# Patient Record
Sex: FEMALE | Race: BLACK OR AFRICAN AMERICAN | NOT HISPANIC OR LATINO
[De-identification: names, ages, dates, MRNs, and addresses within clinical notes are randomized per-mention and may not be internally consistent; named-entity substitution may affect disease eponyms.]

---

## 2017-02-24 ENCOUNTER — APPOINTMENT (OUTPATIENT)
Dept: ORTHOPEDIC SURGERY | Facility: CLINIC | Age: 52
End: 2017-02-24

## 2017-02-24 VITALS
BODY MASS INDEX: 28.6 KG/M2 | HEIGHT: 70.5 IN | WEIGHT: 202 LBS | HEART RATE: 68 BPM | DIASTOLIC BLOOD PRESSURE: 83 MMHG | SYSTOLIC BLOOD PRESSURE: 143 MMHG

## 2017-02-24 DIAGNOSIS — M17.0 BILATERAL PRIMARY OSTEOARTHRITIS OF KNEE: ICD-10-CM

## 2017-06-02 ENCOUNTER — APPOINTMENT (OUTPATIENT)
Dept: ORTHOPEDIC SURGERY | Facility: CLINIC | Age: 52
End: 2017-06-02

## 2017-06-12 ENCOUNTER — APPOINTMENT (OUTPATIENT)
Dept: TRAUMA SURGERY | Facility: CLINIC | Age: 52
End: 2017-06-12

## 2019-11-24 ENCOUNTER — EMERGENCY (EMERGENCY)
Facility: HOSPITAL | Age: 54
LOS: 1 days | Discharge: HOME | End: 2019-11-24
Attending: EMERGENCY MEDICINE | Admitting: EMERGENCY MEDICINE
Payer: COMMERCIAL

## 2019-11-24 VITALS
SYSTOLIC BLOOD PRESSURE: 139 MMHG | HEART RATE: 99 BPM | OXYGEN SATURATION: 100 % | TEMPERATURE: 98 F | RESPIRATION RATE: 18 BRPM | DIASTOLIC BLOOD PRESSURE: 86 MMHG

## 2019-11-24 LAB
ALBUMIN SERPL ELPH-MCNC: 4.6 G/DL — SIGNIFICANT CHANGE UP (ref 3.3–5)
ALP SERPL-CCNC: 60 U/L — SIGNIFICANT CHANGE UP (ref 40–120)
ALT FLD-CCNC: 11 U/L — SIGNIFICANT CHANGE UP (ref 4–33)
ANION GAP SERPL CALC-SCNC: 11 MMO/L — SIGNIFICANT CHANGE UP (ref 7–14)
APTT BLD: 32.8 SEC — SIGNIFICANT CHANGE UP (ref 27.5–36.3)
AST SERPL-CCNC: 13 U/L — SIGNIFICANT CHANGE UP (ref 4–32)
BASOPHILS # BLD AUTO: 0.02 K/UL — SIGNIFICANT CHANGE UP (ref 0–0.2)
BASOPHILS NFR BLD AUTO: 0.3 % — SIGNIFICANT CHANGE UP (ref 0–2)
BILIRUB SERPL-MCNC: 0.2 MG/DL — SIGNIFICANT CHANGE UP (ref 0.2–1.2)
BUN SERPL-MCNC: 16 MG/DL — SIGNIFICANT CHANGE UP (ref 7–23)
CALCIUM SERPL-MCNC: 9.7 MG/DL — SIGNIFICANT CHANGE UP (ref 8.4–10.5)
CHLORIDE SERPL-SCNC: 102 MMOL/L — SIGNIFICANT CHANGE UP (ref 98–107)
CO2 SERPL-SCNC: 27 MMOL/L — SIGNIFICANT CHANGE UP (ref 22–31)
CREAT SERPL-MCNC: 0.92 MG/DL — SIGNIFICANT CHANGE UP (ref 0.5–1.3)
D DIMER BLD IA.RAPID-MCNC: 192 NG/ML — SIGNIFICANT CHANGE UP
EOSINOPHIL # BLD AUTO: 0.09 K/UL — SIGNIFICANT CHANGE UP (ref 0–0.5)
EOSINOPHIL NFR BLD AUTO: 1.3 % — SIGNIFICANT CHANGE UP (ref 0–6)
GLUCOSE SERPL-MCNC: 126 MG/DL — HIGH (ref 70–99)
HCT VFR BLD CALC: 37.8 % — SIGNIFICANT CHANGE UP (ref 34.5–45)
HGB BLD-MCNC: 11.5 G/DL — SIGNIFICANT CHANGE UP (ref 11.5–15.5)
IMM GRANULOCYTES NFR BLD AUTO: 0.8 % — SIGNIFICANT CHANGE UP (ref 0–1.5)
INR BLD: 1.08 — SIGNIFICANT CHANGE UP (ref 0.88–1.17)
LYMPHOCYTES # BLD AUTO: 2.83 K/UL — SIGNIFICANT CHANGE UP (ref 1–3.3)
LYMPHOCYTES # BLD AUTO: 39.6 % — SIGNIFICANT CHANGE UP (ref 13–44)
MCHC RBC-ENTMCNC: 25.1 PG — LOW (ref 27–34)
MCHC RBC-ENTMCNC: 30.4 % — LOW (ref 32–36)
MCV RBC AUTO: 82.4 FL — SIGNIFICANT CHANGE UP (ref 80–100)
MONOCYTES # BLD AUTO: 0.58 K/UL — SIGNIFICANT CHANGE UP (ref 0–0.9)
MONOCYTES NFR BLD AUTO: 8.1 % — SIGNIFICANT CHANGE UP (ref 2–14)
NEUTROPHILS # BLD AUTO: 3.57 K/UL — SIGNIFICANT CHANGE UP (ref 1.8–7.4)
NEUTROPHILS NFR BLD AUTO: 49.9 % — SIGNIFICANT CHANGE UP (ref 43–77)
NRBC # FLD: 0 K/UL — SIGNIFICANT CHANGE UP (ref 0–0)
PLATELET # BLD AUTO: 311 K/UL — SIGNIFICANT CHANGE UP (ref 150–400)
PMV BLD: 10.5 FL — SIGNIFICANT CHANGE UP (ref 7–13)
POTASSIUM SERPL-MCNC: 3.5 MMOL/L — SIGNIFICANT CHANGE UP (ref 3.5–5.3)
POTASSIUM SERPL-SCNC: 3.5 MMOL/L — SIGNIFICANT CHANGE UP (ref 3.5–5.3)
PROT SERPL-MCNC: 8.3 G/DL — SIGNIFICANT CHANGE UP (ref 6–8.3)
PROTHROM AB SERPL-ACNC: 12.3 SEC — SIGNIFICANT CHANGE UP (ref 9.8–13.1)
RBC # BLD: 4.59 M/UL — SIGNIFICANT CHANGE UP (ref 3.8–5.2)
RBC # FLD: 14.9 % — HIGH (ref 10.3–14.5)
SODIUM SERPL-SCNC: 140 MMOL/L — SIGNIFICANT CHANGE UP (ref 135–145)
TROPONIN T, HIGH SENSITIVITY: 7 NG/L — SIGNIFICANT CHANGE UP (ref ?–14)
TROPONIN T, HIGH SENSITIVITY: 7 NG/L — SIGNIFICANT CHANGE UP (ref ?–14)
WBC # BLD: 7.15 K/UL — SIGNIFICANT CHANGE UP (ref 3.8–10.5)
WBC # FLD AUTO: 7.15 K/UL — SIGNIFICANT CHANGE UP (ref 3.8–10.5)

## 2019-11-24 PROCEDURE — 99218: CPT

## 2019-11-24 PROCEDURE — 99244 OFF/OP CNSLTJ NEW/EST MOD 40: CPT

## 2019-11-24 PROCEDURE — 71045 X-RAY EXAM CHEST 1 VIEW: CPT | Mod: 26

## 2019-11-24 RX ORDER — KETOROLAC TROMETHAMINE 30 MG/ML
15 SYRINGE (ML) INJECTION ONCE
Refills: 0 | Status: DISCONTINUED | OUTPATIENT
Start: 2019-11-24 | End: 2019-11-24

## 2019-11-24 RX ORDER — IPRATROPIUM/ALBUTEROL SULFATE 18-103MCG
3 AEROSOL WITH ADAPTER (GRAM) INHALATION ONCE
Refills: 0 | Status: COMPLETED | OUTPATIENT
Start: 2019-11-24 | End: 2019-11-24

## 2019-11-24 RX ORDER — LOSARTAN POTASSIUM 100 MG/1
25 TABLET, FILM COATED ORAL DAILY
Refills: 0 | Status: DISCONTINUED | OUTPATIENT
Start: 2019-11-24 | End: 2019-11-28

## 2019-11-24 RX ORDER — LISINOPRIL 2.5 MG/1
20 TABLET ORAL DAILY
Refills: 0 | Status: DISCONTINUED | OUTPATIENT
Start: 2019-11-24 | End: 2019-11-24

## 2019-11-24 RX ORDER — IPRATROPIUM/ALBUTEROL SULFATE 18-103MCG
3 AEROSOL WITH ADAPTER (GRAM) INHALATION ONCE
Refills: 0 | Status: DISCONTINUED | OUTPATIENT
Start: 2019-11-24 | End: 2019-11-28

## 2019-11-24 RX ORDER — METFORMIN HYDROCHLORIDE 850 MG/1
750 TABLET ORAL DAILY
Refills: 0 | Status: DISCONTINUED | OUTPATIENT
Start: 2019-11-24 | End: 2019-11-28

## 2019-11-24 RX ORDER — HYDROCHLOROTHIAZIDE 25 MG
12.5 TABLET ORAL DAILY
Refills: 0 | Status: DISCONTINUED | OUTPATIENT
Start: 2019-11-24 | End: 2019-11-28

## 2019-11-24 RX ADMIN — Medication 15 MILLIGRAM(S): at 15:12

## 2019-11-24 RX ADMIN — Medication 3 MILLILITER(S): at 13:27

## 2019-11-24 RX ADMIN — Medication 15 MILLIGRAM(S): at 22:50

## 2019-11-24 RX ADMIN — Medication 125 MILLIGRAM(S): at 13:28

## 2019-11-24 RX ADMIN — Medication 3 MILLILITER(S): at 13:28

## 2019-11-24 RX ADMIN — Medication 15 MILLIGRAM(S): at 22:20

## 2019-11-24 RX ADMIN — Medication 3 MILLILITER(S): at 14:03

## 2019-11-24 NOTE — ED ADULT NURSE NOTE - OBJECTIVE STATEMENT
Patient received to [facility], A&Ox4, ambulatory at baseline, c/o SOB. Patient has PMH asthma, intubated 15 times, states this feels like previous asthma exacerbations. Also complaining of R sided chest pain radiating to back. Denies dizziness/HA, no N/V/D. No recent fever/cough/chills. No wheezes heard at this time. No edema noted, no diaphoresis. Pulses present in all extremities. Afebrile at this time. 22 G placed to R AC, labs drawn and sent. Patient received to Tr B, A&Ox4, ambulatory at baseline, c/o SOB. Patient has PMH asthma, intubated 15 times, states this feels like previous asthma exacerbations. Also complaining of R sided chest pain radiating to back. Denies dizziness/HA, no N/V/D. No recent fever/cough/chills. No wheezes heard at this time. No edema noted, no diaphoresis. Pulses present in all extremities. Afebrile at this time. 22 G placed to R AC, labs drawn and sent. Medicated as per orders. Patient reports partial relief of symptoms after duoneb treatments. MD at bedside. Vs as noted. Awaiting further orders, will continue to monitor.

## 2019-11-24 NOTE — ED PROVIDER NOTE - PMH
Asthma - intubated in 2006 and In ICU in 2008 for exacerbation of asthma , uses Advair and ventolin    Chronic Sinusitis    Gall stones    H/O sciatica    H/O sleep apnea - had sleep studies done 2 yrs ago    H/O umbilical hernia repair    H/O uterine leiomyoma    Hypertension    Umbilical hernia    Uterine cyst

## 2019-11-24 NOTE — ED CDU PROVIDER INITIAL DAY NOTE - PHYSICAL EXAMINATION
ATTENDING PHYSICAL EXAM  GEN - NAD; well appearing; A+O x3, speaking full sentences.  RR 14-16.  O2 100%RA.  HEAD - NC/AT; EYES/NOSE - PERRL, EOMI, mucous membranes moist, no discharge; THROAT: Oral cavity and pharynx normal. No inflammation, swelling, exudate, or lesions  NECK: Neck supple, non-tender without lymphadenopathy, no masses, no JVD  PULMONARY - CTA b/l, symmetric breath sounds  CARDIAC -s1s2, RRR, 2/6 THONY LUSB  ABDOMEN - +BS, ND, NT, soft, no guarding, no rebound, no masses   BACK - no CVA tenderness, No vertebral or paravertebral tenderness  EXTREMITIES - symmetric pulses, 2+ dp, capillary refill < 2 seconds, no clubbing, no cyanosis, no edema  SKIN - no rash or bruising   NEUROLOGIC - alert, CN 2-12 intact

## 2019-11-24 NOTE — ED PROVIDER NOTE - PSH
h/o C Section - 83, 86, 92    h/o D&C  multiple times    History of cholecystectomy - 1999    umbilical hernia repair 1993

## 2019-11-24 NOTE — ED CDU PROVIDER INITIAL DAY NOTE - OBJECTIVE STATEMENT
54F hx of asthma, HTN, HLD presents w a cc of SOB c/w prior asthma exacerbation also c/o substernal chest pain radiating to back, worsening over last few months was seen by PMD for same issue, schd'd to see cardiology but has not yet, last stress x2-3 years ago told normal. No worsening LE edema, has had prior dvt in LLE no AC. Denies n/v/f/c/. Denies headache, syncope, lightheadedness, dizziness. Denies chest palpitations, abdominal pain. Denies dysuria, hematuria, hematochezia, BRBPR, tarry stools, diarrhea, constipation.    CDU Note: Agree with above. 55 y/o female pmh htn asthma HLD presents to ER c/o chest pain and shortness of breath. Pt. in ED has trop1: 7, trop 2 pending. negaitve ddimer, negative cxr, - given duonebs and solumedrol which improved sob - sent to CDU for r/o acs - tele, trop x 2 and stress test. 54F hx of asthma, HTN, HLD presents w a cc of SOB c/w prior asthma exacerbation also c/o substernal chest pain radiating to back, worsening over last few months was seen by PMD for same issue, schmansi'd to see cardiology but has not yet, last stress x2-3 years ago told normal. No worsening LE edema, has had prior dvt in LLE no AC. Denies n/v/f/c/. Denies headache, syncope, lightheadedness, dizziness. Denies chest palpitations, abdominal pain. Denies dysuria, hematuria, hematochezia, BRBPR, tarry stools, diarrhea, constipation.    CDU Note: Agree with above. 53 y/o female pmh htn asthma HLD presents to ER c/o chest pain and shortness of breath. Pt. in ED has trop1: 7, trop 2 pending. negaitve ddimer, negative cxr, - given duonebs and solumedrol which improved sob - sent to CDU for r/o acs - tele, trop x 2 and stress test.  CDU Attsabrina Garcia - Agree with above.  I evaluated patient myself.  53 y/o F with hx of severe asthma requiring multiple ICU admissions and intubations in the past.  Has not had exacerbation x few years and not currently on asthma maintenance medications.  Developed shortness of breathe and cough on Wed which progressively worsened.  Received treatments in ED, and currently reports much improved with only minor shortness of breathe.  Also reports constant chest pain x 2 months which at times becomes severe.  States has been evaluated by PMD and GI MD for this, and is scheduled for EST.  Transferred to CDU for continued monitoring and assessment for ACS.

## 2019-11-24 NOTE — ED ADULT NURSE NOTE - CHIEF COMPLAINT QUOTE
Patient brought to ER from [facility] by EMS for asthma attack. Pt was intubated 15 times. Pt given 1 Combivent. Productive cough since Wednesday. 3 asthma attacks this morning and chest pain.

## 2019-11-24 NOTE — ED PROVIDER NOTE - CLINICAL SUMMARY MEDICAL DECISION MAKING FREE TEXT BOX
Noemi PGY3: 4F hx of asthma, HTN, HLD presents w a cc of SOB c/w prior asthma exacerbation also c/o substernal chest pain radiating to back, worsening over last few months was seen by PMD for same issue, schd'd to see cardiology but has not yet, last stress x2-3 years ago told normal.  exam vss non toxic ekg non ischemic c/f asthma exacerbation vs acs vs pe less likely dissection will check labs cxr dimer give duo nebs and steroids, reassess

## 2019-11-24 NOTE — ED PROVIDER NOTE - OBJECTIVE STATEMENT
54F hx of asthma, HTN, HLD presents w a cc of SOB c/w prior asthma exacerbation also c/o substernal chest pain radiating to back, worsening over last few months was seen by PMD for same issue, schmansi'd to see cardiology but has not yet, last stress x2-3 years ago told normal. No worsening LE edema, has had prior dvt in LLE no AC. Denies n/v/f/c/. Denies headache, syncope, lightheadedness, dizziness. Denies chest palpitations, abdominal pain. Denies dysuria, hematuria, hematochezia, BRBPR, tarry stools, diarrhea, constipation.

## 2019-11-24 NOTE — ED ADULT NURSE REASSESSMENT NOTE - NS ED NURSE REASSESS COMMENT FT1
break RN: pt A+OX4. breathing even and unlabored. reports some improvement in symptoms. report given to CDU CELINA Noble. to be brought to CDU for observation/stress test tomorrow.

## 2019-11-24 NOTE — CONSULT NOTE ADULT - SUBJECTIVE AND OBJECTIVE BOX
Patient seen and evaluated @ 1500 in [facility] ER  Chief Complaint: Chest discomfort    HPI: 54 year-old  woman with known history of asthma, hypertension, dyslipidemia, presents with a chief complaint of shortness of breath, episodes of chest discomfort with radiation to back, worsening over last few months was seen by PMD for same issue, scheduled to see cardiology but has not yet (last stress x2-3 years ago, told normal). The pain is reproducible by putting mild pressure over the area. No worsening LE edema, has had prior DVT in LLE no AC. Denies n/v/f/c/. Denies headache, syncope, lightheadedness, dizziness. Denies chest palpitations, abdominal pain. Denies dysuria, hematuria, hematochezia, BRBPR, tarry stools, diarrhea, constipation.    PMH:   Hypertension  H/O sleep apnea - had sleep studies done 2 yrs ago  Uterine cyst  Gall stones  Umbilical hernia  H/O umbilical hernia repair  H/O uterine leiomyoma  H/O sciatica  Chronic Sinusitis  Asthma - intubated in  and In ICU in  for exacerbation of asthma , uses Advair and ventolin    PSH:   History of cholecystectomy -   h/o D&C  multiple times  h/o C Section - 83, 86, 92  umbilical hernia repair     Home Medications: asthma inhaler  No cardiac medications    Allergies:  contrast dye (Anaphylaxis)  iodine (Anaphylaxis; Other)  latex (Hives)  Seafood; dye (Anaphylaxis)    FAMILY HISTORY:  Family history of prostate cancer: Father  from Prostate Cancer  Family history of hypertension: Mother and Father  Family history of diabetes mellitus: Mother and Father    Social History: marries, lives with   Smoking: nonsmoker  Alcohol: no EtOH abuse  Drugs: no illicit drug use    Review of Systems:    Constitutional: No fever, chills, fatigue, or changes in weight  HEENT: No blurry vision, eye pain, headache, runny nose, or sore throat  Respiratory: No shortness of breath, cough, or wheezing  Cardiovascular: Episodic chest pain that is transient  Gastrointestinal: No nausea, vomiting, diarrhea, or abdominal pain  Genitourinary: No dysuria or incontinence  Extremities: No lower extremity swelling  Neurologic: No focal findings  Lymphatic: No lymphadenopathy   Skin: No rash  Psychiatry: No anxiety or depression  10 point review of systems is otherwise negative except as mentioned above            Physical Exam:  T(C): 36.6 (19 @ 13:12), Max: 36.7 (11-24-19 @ 12:41)  HR: 90 (19 @ 15:58) (90 - 99)  BP: 139/61 (19 @ 15:58) (133/61 - 139/86)  RR: 18 (19 @ 15:58) (18 - 24)  SpO2: 100% (19 @ 15:58) (100% - 100%)  Wt(kg): --    Daily     Daily     Appearance: Normal, well groomed, NAD  Eyes: PERRLA, EOMI, pink conjunctiva, no scleral icterus   HENT: Normal oral mucosa  Cardiovascular: RRR, S1, S2, no murmur, rub, or gallop; no edema; no JVD  Procedural Access Site: Clean, dry, intact, without hematoma  Respiratory: Clear to auscultation bilaterally  Gastrointestinal: Soft, non-tender, non-distended, BS+  Musculoskeletal: No clubbing or joint deformity +chest wall tenderness   Neurologic: No focal weakness  Lymphatic: No lymphadenopathy  Psychiatry: AAOx3 with appropriate mood and affect  Skin: No rashes, ecchymoses, or cyanosis    Cardiovascular Diagnostic Testing:  ECG: NSR, voltage criteria for LVH, no ischemic changes     Echo: none on file    Stress Testing: none on file    Interpretation of Telemetry: NSR    Imaging: my interpretation of CXR: clear lungs with normal cardiac silhouette, no obvious rib fractures    Labs:                        11.5   7.15  )-----------( 311      ( 2019 13:34 )             37.8         140  |  102  |  16  ----------------------------<  126<H>  3.5   |  27  |  0.92    Ca    9.7      2019 13:34    TPro  8.3  /  Alb  4.6  /  TBili  0.2  /  DBili  x   /  AST  13  /  ALT  11  /  AlkPhos  60      PT/INR - ( 2019 13:34 )   PT: 12.3 SEC;   INR: 1.08          PTT - ( 2019 13:34 )  PTT:32.8 SEC

## 2019-11-24 NOTE — CONSULT NOTE ADULT - ASSESSMENT
54 year-old woman with likely costochondritis. Will rule out ACS by second set of negative high sensitivity troponin.  Planned for outpatient stress test. Can be done here tomorrow.  Monitor on tele in observation unit.    Can check echocardiogram and stress test while in observation unit, but unlikely cardiac etiology of symptoms that are reproducible, not consistently exertional.    Please call me with any questions overnight, cell: [phone].  Patient to be followed by her cardiologist, Carlos Griffith, DO starting tomorrow morning.

## 2019-11-24 NOTE — ED PROVIDER NOTE - PHYSICAL EXAMINATION
GEN APPEARANCE: WDWN, alert and cooperative, non-toxic appearing, uncomfortable appearing, resp discomfort   HEAD: Atraumatic, normocephalic   EYES: PERRLa, EOMI, vision grossly intact.   EARS: Gross hearing intact.   NOSE: No nasal discharge, no external evidence of epistaxis.   NECK: Supple  CV: RRR, S1S2, no c/r/m/g. No cyanosis or pallor. Extremities warm, well perfused. Cap refill <2 seconds. No bruits.   LUNGS: CTAB. No wheezing. No rales. No rhonchi. + L diminished breath sounds.   ABDOMEN: Soft, NTND. No guarding or rebound. No masses.   MSK: Spine appears normal, no spine point tenderness. No CVA ttp. No joint erythema or tenderness. Normal muscular development. Pelvis stable.  EXTREMITIES: No peripheral edema. No obvious joint or bony deformity.  NEURO: Alert, follows commands. Weight bearing normal. Speech normal. Sensation and motor normal x4 extremities.   SKIN: Normal color for race, warm, dry and intact. No evidence of rash.  PSYCH: Normal mood and affect.

## 2019-11-24 NOTE — ED ADULT TRIAGE NOTE - CHIEF COMPLAINT QUOTE
Patient brought to ER from [facility] by EMS for asthma attack. Pt was intubated 15 times. Pt given 1 Combivent. Productive cough since Wednesday. 3 asthma attacks this morning and chest pain.

## 2019-11-25 VITALS
TEMPERATURE: 98 F | SYSTOLIC BLOOD PRESSURE: 119 MMHG | HEART RATE: 68 BPM | OXYGEN SATURATION: 100 % | RESPIRATION RATE: 16 BRPM | DIASTOLIC BLOOD PRESSURE: 61 MMHG

## 2019-11-25 LAB — HBA1C BLD-MCNC: 6.6 % — HIGH (ref 4–5.6)

## 2019-11-25 PROCEDURE — 93018 CV STRESS TEST I&R ONLY: CPT | Mod: GC

## 2019-11-25 PROCEDURE — 78452 HT MUSCLE IMAGE SPECT MULT: CPT | Mod: 26

## 2019-11-25 PROCEDURE — 99217: CPT

## 2019-11-25 PROCEDURE — 93306 TTE W/DOPPLER COMPLETE: CPT | Mod: 26

## 2019-11-25 PROCEDURE — 93016 CV STRESS TEST SUPVJ ONLY: CPT | Mod: GC

## 2019-11-25 RX ORDER — ALBUTEROL 90 UG/1
2 AEROSOL, METERED ORAL
Qty: 1 | Refills: 0
Start: 2019-11-25

## 2019-11-25 RX ADMIN — Medication 12.5 MILLIGRAM(S): at 06:00

## 2019-11-25 RX ADMIN — METFORMIN HYDROCHLORIDE 750 MILLIGRAM(S): 850 TABLET ORAL at 13:25

## 2019-11-25 RX ADMIN — LOSARTAN POTASSIUM 25 MILLIGRAM(S): 100 TABLET, FILM COATED ORAL at 06:00

## 2019-11-25 NOTE — ED CDU PROVIDER DISPOSITION NOTE - ATTENDING CONTRIBUTION TO CARE
55 y/o female pmh htn asthma HLD presents to ER c/o chest pain for few months and shortness of breath x several days with cough, similar to prior asthma. Pt. in ED has trop1: 7, trop 2: 7  with negative delta negaitve ddimer, negative cxr, - given duonebs and solumedrol with significant improvement in sob, seen by cardiology, rec stress though low likelihood for acs. On rounds this am, Patient states improved sob, no cp, no edema. Exam benign. Was monitored on tele and had stress test which was negative. Patient was discharged home with steroid course for asthma, all results d/w patient and copies given, instructed to f/u pcp/cardiology within next few days. Return precautions discussed.

## 2019-11-25 NOTE — ED CDU PROVIDER DISPOSITION NOTE - NSFOLLOWUPINSTRUCTIONS_ED_ALL_ED_FT
Advance activity as tolerated.  Continue all previously prescribed medications as directed unless otherwise instructed.  Take Prednisone once daily for the next 5 days.  Use Albuterol Inhaler 2 puffs every 4 to 6 hours as needed for shortness of breath and/or wheezing.  Follow up with your primary care physician and cardiologist in 48-72 hours- bring copies of your results.  Return to the ER for worsening or persistent symptoms, including but not limited to worsening/persistent chest pain, shortness of breath, wheezing, lightheadedness, passing out and/or ANY NEW OR CONCERNING SYMPTOMS. If you have issues obtaining follow up, please call: [phone] to obtain a doctor or specialist who takes your insurance in your area.  You may call [phone] to make an appointment with the internal medicine clinic.

## 2019-11-25 NOTE — ED CDU PROVIDER SUBSEQUENT DAY NOTE - PROGRESS NOTE DETAILS
Pt notes feeling significantly better.  Stress and echo unremarkable.  Results discussed with pt's cardiologist Dr. Griffith.  Pt is medically stable for discharge. Pt to follow up with PMD and her cardiologist.  Strict return precautions given.  Pt ambulatory around unit without any difficulty.  Reassessment performed and plan for discharge discussed with Dr. Day who agrees with disposition and discharge plan.

## 2019-11-25 NOTE — ED CDU PROVIDER DISPOSITION NOTE - CLINICAL COURSE
53 y/o female pmh htn asthma HLD presents to ER c/o chest pain for few months and shortness of breath x several days with cough, similar to prior asthma. Pt. in ED has trop1: 7, trop 2: 7  with negative delta negaitve ddimer, negative cxr, - given duonebs and solumedrol with significant improvement in sob, seen by cardiology, rec stress though low likelihood for acs. On rounds this am, Patient states improved sob, no cp, no edema. Exam benign. Was monitored on tele and had stress test which was negative. Patient was discharged home with steroid course for asthma, all results d/w patient and copies given, instructed to f/u pcp/cardiology within next few days. Return precautions discussed.

## 2019-11-25 NOTE — ED CDU PROVIDER SUBSEQUENT DAY NOTE - PHYSICAL EXAMINATION
GEN - NAD; well appearing; A+O x3, speaking full sentences.  RR 14-16.  O2 100%RA.  HEAD - NC/AT; EYES/NOSE - PERRL, EOMI, mucous membranes moist, no discharge; THROAT: Oral cavity and pharynx normal. No inflammation, swelling, exudate, or lesions  NECK: Neck supple, non-tender without lymphadenopathy, no masses, no JVD  PULMONARY - CTA b/l, symmetric breath sounds  CARDIAC -s1s2, RRR, 2/6 THONY LUSB  ABDOMEN - +BS, ND, NT, soft, no guarding, no rebound, no masses   BACK - no CVA tenderness, No vertebral or paravertebral tenderness  EXTREMITIES - symmetric pulses, 2+ dp, capillary refill < 2 seconds, no clubbing, no cyanosis, no edema  SKIN - no rash or bruising   NEUROLOGIC - alert, CN 2-12 intact

## 2019-11-25 NOTE — ED CDU PROVIDER SUBSEQUENT DAY NOTE - HISTORY
CDU Initial Dayt Note: 55 y/o female pmh htn asthma HLD presents to ER c/o chest pain and shortness of breath. Pt. in ED has trop1: 7, trop 2 pending. negaitve ddimer, negative cxr, - given duonebs and solumedrol which improved sob - sent to CDU for r/o acs - tele, trop x 2 and stress test.  CDU Subsequent Day Note: DANNA Alas, Agree w/ above, pt resting in bed comfortably in NAD, has had c/o intermittent chest pain, feeling better after Toradol. Pending stress/echo, no events on monitor since sign out last night.

## 2019-11-25 NOTE — ED CDU PROVIDER SUBSEQUENT DAY NOTE - ATTENDING CONTRIBUTION TO CARE
53 y/o female pmh htn asthma HLD presents to ER c/o chest pain for few months and shortness of breath x several days with cough, similar to prior asthma. Pt. in ED has trop1: 7, trop 2: 7  with negative delta negaitve ddimer, negative cxr, - given duonebs and solumedrol with significant improvement in sob, seen by cardiology, rec stress though low likelihood for acs. On rounds this am, Patient states improved sob, no cp, no edema.  on exam  A & O x 3, NAD, HEENT WNL and no facial asymmetry; lungs CTAB, heart with reg rhythm without murmur; abdomen soft NTND; extremities with no edema; neuro exam non focal with no motor or sensory deficits.  Plan for today, continue tele monitoring/stress test.

## 2019-11-25 NOTE — ED CDU PROVIDER DISPOSITION NOTE - PATIENT PORTAL LINK FT
You can access the FollowMyHealth Patient Portal offered by [facility] by registering at the following website: [email or web address]. By joining [employer]’s FollowMyHealth portal, you will also be able to view your health information using other applications (apps) compatible with our system.

## 2019-12-02 ENCOUNTER — APPOINTMENT (OUTPATIENT)
Dept: CT IMAGING | Facility: IMAGING CENTER | Age: 54
End: 2019-12-02
Payer: COMMERCIAL

## 2019-12-02 ENCOUNTER — OUTPATIENT (OUTPATIENT)
Dept: OUTPATIENT SERVICES | Facility: HOSPITAL | Age: 54
LOS: 1 days | End: 2019-12-02
Payer: COMMERCIAL

## 2019-12-02 DIAGNOSIS — Z00.8 ENCOUNTER FOR OTHER GENERAL EXAMINATION: ICD-10-CM

## 2019-12-02 PROCEDURE — 74176 CT ABD & PELVIS W/O CONTRAST: CPT

## 2019-12-02 PROCEDURE — 74176 CT ABD & PELVIS W/O CONTRAST: CPT | Mod: 26

## 2019-12-29 ENCOUNTER — RESULT REVIEW (OUTPATIENT)
Age: 54
End: 2019-12-29

## 2020-03-02 ENCOUNTER — EMERGENCY (EMERGENCY)
Facility: HOSPITAL | Age: 55
LOS: 1 days | Discharge: HOME | End: 2020-03-02
Attending: EMERGENCY MEDICINE
Payer: COMMERCIAL

## 2020-03-02 VITALS
HEART RATE: 62 BPM | HEIGHT: 70 IN | WEIGHT: 186.95 LBS | OXYGEN SATURATION: 98 % | SYSTOLIC BLOOD PRESSURE: 104 MMHG | RESPIRATION RATE: 16 BRPM | TEMPERATURE: 98 F | DIASTOLIC BLOOD PRESSURE: 64 MMHG

## 2020-03-02 LAB
ALBUMIN SERPL ELPH-MCNC: 4.2 G/DL — SIGNIFICANT CHANGE UP (ref 3.3–5)
ALP SERPL-CCNC: 49 U/L — SIGNIFICANT CHANGE UP (ref 40–120)
ALT FLD-CCNC: 10 U/L — SIGNIFICANT CHANGE UP (ref 10–45)
ANION GAP SERPL CALC-SCNC: 11 MMOL/L — SIGNIFICANT CHANGE UP (ref 5–17)
APPEARANCE UR: CLEAR — SIGNIFICANT CHANGE UP
AST SERPL-CCNC: 16 U/L — SIGNIFICANT CHANGE UP (ref 10–40)
BACTERIA # UR AUTO: NEGATIVE — SIGNIFICANT CHANGE UP
BASOPHILS # BLD AUTO: 0.04 K/UL — SIGNIFICANT CHANGE UP (ref 0–0.2)
BASOPHILS NFR BLD AUTO: 0.5 % — SIGNIFICANT CHANGE UP (ref 0–2)
BILIRUB SERPL-MCNC: 0.2 MG/DL — SIGNIFICANT CHANGE UP (ref 0.2–1.2)
BILIRUB UR-MCNC: NEGATIVE — SIGNIFICANT CHANGE UP
BUN SERPL-MCNC: 16 MG/DL — SIGNIFICANT CHANGE UP (ref 7–23)
CALCIUM SERPL-MCNC: 9.3 MG/DL — SIGNIFICANT CHANGE UP (ref 8.4–10.5)
CHLORIDE SERPL-SCNC: 105 MMOL/L — SIGNIFICANT CHANGE UP (ref 96–108)
CO2 SERPL-SCNC: 24 MMOL/L — SIGNIFICANT CHANGE UP (ref 22–31)
COLOR SPEC: YELLOW — SIGNIFICANT CHANGE UP
CREAT SERPL-MCNC: 0.7 MG/DL — SIGNIFICANT CHANGE UP (ref 0.5–1.3)
DIFF PNL FLD: NEGATIVE — SIGNIFICANT CHANGE UP
EOSINOPHIL # BLD AUTO: 0.24 K/UL — SIGNIFICANT CHANGE UP (ref 0–0.5)
EOSINOPHIL NFR BLD AUTO: 3.3 % — SIGNIFICANT CHANGE UP (ref 0–6)
EPI CELLS # UR: 1 /HPF — SIGNIFICANT CHANGE UP
GLUCOSE SERPL-MCNC: 103 MG/DL — HIGH (ref 70–99)
GLUCOSE UR QL: NEGATIVE — SIGNIFICANT CHANGE UP
HCT VFR BLD CALC: 37.3 % — SIGNIFICANT CHANGE UP (ref 34.5–45)
HGB BLD-MCNC: 11.1 G/DL — LOW (ref 11.5–15.5)
HYALINE CASTS # UR AUTO: 5 /LPF — HIGH (ref 0–2)
IMM GRANULOCYTES NFR BLD AUTO: 0.3 % — SIGNIFICANT CHANGE UP (ref 0–1.5)
KETONES UR-MCNC: NEGATIVE — SIGNIFICANT CHANGE UP
LEUKOCYTE ESTERASE UR-ACNC: ABNORMAL
LYMPHOCYTES # BLD AUTO: 3.71 K/UL — HIGH (ref 1–3.3)
LYMPHOCYTES # BLD AUTO: 50.8 % — HIGH (ref 13–44)
MCHC RBC-ENTMCNC: 25.6 PG — LOW (ref 27–34)
MCHC RBC-ENTMCNC: 29.8 GM/DL — LOW (ref 32–36)
MCV RBC AUTO: 85.9 FL — SIGNIFICANT CHANGE UP (ref 80–100)
MONOCYTES # BLD AUTO: 0.7 K/UL — SIGNIFICANT CHANGE UP (ref 0–0.9)
MONOCYTES NFR BLD AUTO: 9.6 % — SIGNIFICANT CHANGE UP (ref 2–14)
NEUTROPHILS # BLD AUTO: 2.6 K/UL — SIGNIFICANT CHANGE UP (ref 1.8–7.4)
NEUTROPHILS NFR BLD AUTO: 35.5 % — LOW (ref 43–77)
NITRITE UR-MCNC: NEGATIVE — SIGNIFICANT CHANGE UP
NRBC # BLD: 0 /100 WBCS — SIGNIFICANT CHANGE UP (ref 0–0)
PH UR: 6 — SIGNIFICANT CHANGE UP (ref 5–8)
PLATELET # BLD AUTO: 277 K/UL — SIGNIFICANT CHANGE UP (ref 150–400)
POTASSIUM SERPL-MCNC: 4 MMOL/L — SIGNIFICANT CHANGE UP (ref 3.5–5.3)
POTASSIUM SERPL-SCNC: 4 MMOL/L — SIGNIFICANT CHANGE UP (ref 3.5–5.3)
PROT SERPL-MCNC: 7.2 G/DL — SIGNIFICANT CHANGE UP (ref 6–8.3)
PROT UR-MCNC: ABNORMAL
RBC # BLD: 4.34 M/UL — SIGNIFICANT CHANGE UP (ref 3.8–5.2)
RBC # FLD: 13.9 % — SIGNIFICANT CHANGE UP (ref 10.3–14.5)
RBC CASTS # UR COMP ASSIST: 5 /HPF — HIGH (ref 0–4)
SODIUM SERPL-SCNC: 140 MMOL/L — SIGNIFICANT CHANGE UP (ref 135–145)
SP GR SPEC: 1.03 — HIGH (ref 1.01–1.02)
UROBILINOGEN FLD QL: NEGATIVE — SIGNIFICANT CHANGE UP
WBC # BLD: 7.31 K/UL — SIGNIFICANT CHANGE UP (ref 3.8–10.5)
WBC # FLD AUTO: 7.31 K/UL — SIGNIFICANT CHANGE UP (ref 3.8–10.5)
WBC UR QL: 50 /HPF — HIGH (ref 0–5)

## 2020-03-02 PROCEDURE — 71045 X-RAY EXAM CHEST 1 VIEW: CPT | Mod: 26

## 2020-03-02 PROCEDURE — 99284 EMERGENCY DEPT VISIT MOD MDM: CPT

## 2020-03-02 PROCEDURE — 72100 X-RAY EXAM L-S SPINE 2/3 VWS: CPT | Mod: 26

## 2020-03-02 RX ORDER — IBUPROFEN 200 MG
600 TABLET ORAL ONCE
Refills: 0 | Status: COMPLETED | OUTPATIENT
Start: 2020-03-02 | End: 2020-03-02

## 2020-03-02 RX ORDER — LIDOCAINE 4 G/100G
1 CREAM TOPICAL ONCE
Refills: 0 | Status: COMPLETED | OUTPATIENT
Start: 2020-03-02 | End: 2020-03-02

## 2020-03-02 RX ORDER — MORPHINE SULFATE 50 MG/1
4 CAPSULE, EXTENDED RELEASE ORAL ONCE
Refills: 0 | Status: DISCONTINUED | OUTPATIENT
Start: 2020-03-02 | End: 2020-03-02

## 2020-03-02 RX ORDER — ACETAMINOPHEN 500 MG
975 TABLET ORAL ONCE
Refills: 0 | Status: COMPLETED | OUTPATIENT
Start: 2020-03-02 | End: 2020-03-02

## 2020-03-02 RX ADMIN — Medication 975 MILLIGRAM(S): at 21:12

## 2020-03-02 RX ADMIN — LIDOCAINE 1 PATCH: 4 CREAM TOPICAL at 21:29

## 2020-03-02 RX ADMIN — Medication 600 MILLIGRAM(S): at 21:28

## 2020-03-02 RX ADMIN — MORPHINE SULFATE 4 MILLIGRAM(S): 50 CAPSULE, EXTENDED RELEASE ORAL at 23:16

## 2020-03-02 NOTE — ED PROVIDER NOTE - OBJECTIVE STATEMENT
54y f PMHx sciatica, HTN, asthma, DM2 p/w back pain. Pt reports severe lower back pain since yesterday radiating down her left leg. Reports the pain began yesterday with nonspecific onset but admits to doing laundry shortly before. Reports difficulty walking and feeling of weakness in her left foot. 54y f PMHx sciatica, HTN, asthma, DM2 p/w back pain. Pt reports severe lower back pain since yesterday radiating down her left leg. Reports the pain began yesterday with nonspecific onset. She denies traumatic injury, heavy lifting or twisting however admits to doing laundry shortly before. Reports difficulty walking and feeling of weakness in her left foot. She reports she had RLE weakness/numbness which has resolved. Denies urinary retention/incontinence, fever, chills, saddle anesthesia, IV drug use, CP, SOB.

## 2020-03-02 NOTE — ED PROVIDER NOTE - MUSCULOSKELETAL MINIMAL EXAM
atraumatic/neck supple/MUSCLE SPASMS/normal range of motion/TENDERNESS/+LLE straight leg test to approx 45deg

## 2020-03-02 NOTE — ED PROVIDER NOTE - PROGRESS NOTE DETAILS
Pt ambulatory in ED hallways without difficulty. Admits to moderate improvement from pain meds. Labs pending. will most likely DC with followup - Greber Crowley PA-C

## 2020-03-02 NOTE — ED PROVIDER NOTE - NEUROLOGICAL, MLM
Alert and oriented, no focal deficits, no motor or sensory deficits. RLE/LLE sensory grossly intact. 5/5 strength throughout

## 2020-03-02 NOTE — ED PROVIDER NOTE - RAPID ASSESSMENT
54y F with PMHx of Sciatica, HTN, Asthma, DMT2 (non insulin dependent) presents to ED c/o severe back pain radiating down B/L legs, worse in left leg, with weakness in B/L LE since yesterday. Currently unable to ambulate due to sx. Reports similar episodes of sx when pt was 24 y/o. Denies urinary or bowel incontinence, F/C, fall or trauma to affected area, or recent travel. Further denies use of blood thinners. Allergic to IV contrast dye.     **Pt seen in waiting room by Rui OCONNOR), documentation completed by Keyla Parra. Pt to be sent to main ED for further evaluation - all orders placed to be followed by MD in the main ED**

## 2020-03-02 NOTE — ED PROVIDER NOTE - NS_ ATTENDINGSCRIBEDETAILS _ED_A_ED_FT
The scribe's documentation has been prepared under my direction and personally reviewed by me in its entirety. I confirm that the note above accurately reflects all work, treatment, procedures, and medical decision making performed by me.  RAGHAVENDRA Limon MD   I have performed a rapid screening assessment of this patient in the waiting room.  Additional evaluation and/or testing to be performed in the main Emergency Department by another attending. RAGHAVENDRA Limon MD

## 2020-03-02 NOTE — ED PROVIDER NOTE - CLINICAL SUMMARY MEDICAL DECISION MAKING FREE TEXT BOX
Back pain,nontraumatic this am with rad to left leg, Diff ambulating. concerns for sciatica no red flag ss. Treat pain. reassess  Phong Patel MD, Facep

## 2020-03-02 NOTE — ED PROVIDER NOTE - PATIENT PORTAL LINK FT
You can access the FollowMyHealth Patient Portal offered by [facility] by registering at the following website: [email or web address]. By joining [employer]’s FollowMyHealth portal, you will also be able to view your health information using other applications (apps) compatible with our system.

## 2020-03-02 NOTE — ED PROVIDER NOTE - NSFOLLOWUPINSTRUCTIONS_ED_ALL_ED_FT
Please follow up with your primary care doctor within 1 week.     Take Motrin 400-600mg every 6 hrs as needed for pain. Take with food   Take Tylenol 650mg every 6 hrs as needed for pain.   Oxycodone 5mg every 6 hours as needed for pain. Do not drive or consume alcohol while taking.     Return to ED for worsening pain, inability to walk, numbness, weakness, numbness to your buttock area, or any other concerns.

## 2020-03-02 NOTE — ED PROVIDER NOTE - ATTENDING CONTRIBUTION TO CARE
Private Physician Anahi ([facility])  54y female pmh  Asthma, Gallstones, HTN, Sciatica pt comes to ed complains of pain left back rad to leg with diff ambulating. No incontinece, loc, trauma, fever chills. shortness of breath.cp. PE WDWN female awake alert ncadt neck supple chest clear anterior & posterior abd soft cv no rmg, neuro no focal defects. +slr and cross striaight leg raise distal pulse sensation intact  Phong Patel MD, Facep

## 2020-03-03 VITALS
RESPIRATION RATE: 18 BRPM | OXYGEN SATURATION: 99 % | HEART RATE: 60 BPM | DIASTOLIC BLOOD PRESSURE: 65 MMHG | SYSTOLIC BLOOD PRESSURE: 120 MMHG | TEMPERATURE: 98 F

## 2020-03-03 PROCEDURE — 85027 COMPLETE CBC AUTOMATED: CPT

## 2020-03-03 PROCEDURE — 87086 URINE CULTURE/COLONY COUNT: CPT

## 2020-03-03 PROCEDURE — 87186 SC STD MICRODIL/AGAR DIL: CPT

## 2020-03-03 PROCEDURE — 96374 THER/PROPH/DIAG INJ IV PUSH: CPT

## 2020-03-03 PROCEDURE — 81001 URINALYSIS AUTO W/SCOPE: CPT

## 2020-03-03 PROCEDURE — 71045 X-RAY EXAM CHEST 1 VIEW: CPT

## 2020-03-03 PROCEDURE — 72100 X-RAY EXAM L-S SPINE 2/3 VWS: CPT

## 2020-03-03 PROCEDURE — 99284 EMERGENCY DEPT VISIT MOD MDM: CPT | Mod: 25

## 2020-03-03 PROCEDURE — 80053 COMPREHEN METABOLIC PANEL: CPT

## 2020-03-03 RX ORDER — OXYCODONE HYDROCHLORIDE 5 MG/1
1 TABLET ORAL
Qty: 20 | Refills: 0
Start: 2020-03-03 | End: 2020-03-07

## 2020-03-03 RX ORDER — CEPHALEXIN 500 MG
1 CAPSULE ORAL
Qty: 14 | Refills: 0
Start: 2020-03-03 | End: 2020-03-09

## 2020-03-03 NOTE — ED ADULT NURSE NOTE - OBJECTIVE STATEMENT
54y f PMHx sciatica, HTN, asthma, DM2 p/w back pain. Pt reports severe lower back pain since yesterday radiating down her left leg. Reports the pain began yesterday with nonspecific onset. She denies traumatic injury, heavy lifting or twisting however admits to doing laundry shortly before. Reports difficulty walking and feeling of weakness in her left foot. She reports she had RLE weakness/numbness which has resolved. Denies urinary retention/incontinence, fever, chills, saddle anesthesia, IV drug use, CP, SOB.

## 2020-03-05 NOTE — ED POST DISCHARGE NOTE - DETAILS
3/5/20: LVM for daughter, pts primary # not in service. Pt was seen in ED for back pain, given keflex for suspected UTI. Will call back to determine if symptomatic - may DC abx if not. - Gerber Crowley PA-C 3/5/20: Pt contacted she is not having any urinary symptoms. advised her to DC antibiotics at this time. Gave follow up instructions and return precautions. No need for further callback. - Gerber Crowley PA-C

## 2021-02-03 ENCOUNTER — RESULT REVIEW (OUTPATIENT)
Age: 56
End: 2021-02-03

## 2021-03-22 NOTE — ED ADULT TRIAGE NOTE - BSA (M2)
LOV 3/11/2021    LAST LAB    LAST RX  Pantoprazole Sodium 40 MG Oral Tab EC 30 tablet 0 2/11/2021         Next OV   Future Appointments   Date Time Provider Kiel Alarcon   3/22/2021  3:00 PM Gabby Queen MD [address] [place] [address]
2.03

## 2022-02-23 ENCOUNTER — RESULT REVIEW (OUTPATIENT)
Age: 57
End: 2022-02-23

## 2022-09-17 ENCOUNTER — NON-APPOINTMENT (OUTPATIENT)
Age: 57
End: 2022-09-17

## 2022-09-18 ENCOUNTER — RESULT REVIEW (OUTPATIENT)
Age: 57
End: 2022-09-18

## 2023-07-20 ENCOUNTER — EMERGENCY (EMERGENCY)
Facility: HOSPITAL | Age: 58
LOS: 1 days | Discharge: HOME | End: 2023-07-20
Attending: EMERGENCY MEDICINE | Admitting: EMERGENCY MEDICINE
Payer: MEDICAID

## 2023-07-20 VITALS
DIASTOLIC BLOOD PRESSURE: 74 MMHG | SYSTOLIC BLOOD PRESSURE: 143 MMHG | RESPIRATION RATE: 18 BRPM | TEMPERATURE: 98 F | OXYGEN SATURATION: 100 % | HEART RATE: 68 BPM

## 2023-07-20 PROCEDURE — 99285 EMERGENCY DEPT VISIT HI MDM: CPT

## 2023-07-20 PROCEDURE — 93010 ELECTROCARDIOGRAM REPORT: CPT

## 2023-07-20 NOTE — ED ADULT NURSE NOTE - NSICDXPASTMEDICALHX_GEN_ALL_CORE_FT
PAST MEDICAL HISTORY:  Asthma - intubated in 2006 and In ICU in 2008 for exacerbation of asthma , uses Advair and ventolin     Chronic Sinusitis     Gall stones     H/O sciatica     H/O sleep apnea - had sleep studies done 2 yrs ago     H/O umbilical hernia repair     H/O uterine leiomyoma     Hypertension     Umbilical hernia     Uterine cyst

## 2023-07-20 NOTE — ED ADULT NURSE NOTE - NSICDXFAMILYHX_GEN_ALL_CORE_FT
FAMILY HISTORY:  Family history of diabetes mellitus, Mother and Father  Family history of hypertension, Mother and Father  Family history of prostate cancer, Father  from Prostate Cancer

## 2023-07-20 NOTE — ED ADULT TRIAGE NOTE - BP NONINVASIVE DIASTOLIC (MM HG)
Patient off unit with transport. VSS. Patient alert and oriented. Patient on 2L NC to maintain spo2 >90% while sleeping.     All belongings in backpack and to floor with patient.    74

## 2023-07-20 NOTE — ED ADULT NURSE NOTE - NSFALLUNIVINTERV_ED_ALL_ED
Bed/Stretcher in lowest position, wheels locked, appropriate side rails in place/Call bell, personal items and telephone in reach/Instruct patient to call for assistance before getting out of bed/chair/stretcher/Non-slip footwear applied when patient is off stretcher/[place] to call system/Physically safe environment - no spills, clutter or unnecessary equipment/Purposeful proactive rounding/Room/bathroom lighting operational, light cord in reach

## 2023-07-20 NOTE — ED ADULT NURSE NOTE - NSICDXPASTSURGICALHX_GEN_ALL_CORE_FT
PAST SURGICAL HISTORY:  h/o C Section - 83, 86, 92     h/o D&C  multiple times     History of cholecystectomy - 1999     umbilical hernia repair 1993

## 2023-07-20 NOTE — ED ADULT NURSE NOTE - OBJECTIVE STATEMENT
Facilitator RN: Patient received in room 24. Patient A&Ox4 and ambulatory at baseline. Patient presents to the ED c/o left leg pain with radiation to lower back. phx arthritis, asthma, HTN, DM. Patient states yesterday she received a "gel injection" from her outpatient provider for arthritis. Patient states today she started to experience shortness of breath, dyspnea, chest pain, "shooting right arm pain", left leg pain with radiation to lower back and was referred to come into the ED by outpatient provider for a possible reaction to injection. Patient states she is currently able to bear weight on her legs and ambulate; patient rates pain 10/10 at this time. Patient able to speak in complete uninterrupted sentences; patient sating above 95% on room air. Vital signs stable, airway patent, chest rise equal bilaterally, lung sounds clear and equal bilaterally, respirations unlabored.  Patient denies headache, lightheadedness, and dizziness. Abdomen flat, soft and non-distended; patient denies nausea/vomiting and changes in BMs/urine. Pulse/motor/sensory present and no edema noted to all four extremities. Safety measures in place, call bell within reach and family at bedside. Report given to primary nurse, CELINA Tillman.

## 2023-07-20 NOTE — ED ADULT TRIAGE NOTE - CHIEF COMPLAINT QUOTE
pt c/o left leg pain, lower back pain, chest pain and shortness of breath that started after receiving a "gel shot" in left knee. history of HTN, DM, HLD

## 2023-07-21 VITALS
HEART RATE: 65 BPM | SYSTOLIC BLOOD PRESSURE: 132 MMHG | OXYGEN SATURATION: 100 % | RESPIRATION RATE: 17 BRPM | TEMPERATURE: 98 F | DIASTOLIC BLOOD PRESSURE: 72 MMHG

## 2023-07-21 LAB
ALBUMIN SERPL ELPH-MCNC: 4.8 G/DL — SIGNIFICANT CHANGE UP (ref 3.3–5)
ALP SERPL-CCNC: 57 U/L — SIGNIFICANT CHANGE UP (ref 40–120)
ALT FLD-CCNC: 14 U/L — SIGNIFICANT CHANGE UP (ref 4–33)
ANION GAP SERPL CALC-SCNC: 14 MMOL/L — SIGNIFICANT CHANGE UP (ref 7–14)
AST SERPL-CCNC: 22 U/L — SIGNIFICANT CHANGE UP (ref 4–32)
BASOPHILS # BLD AUTO: 0.03 K/UL — SIGNIFICANT CHANGE UP (ref 0–0.2)
BASOPHILS NFR BLD AUTO: 0.4 % — SIGNIFICANT CHANGE UP (ref 0–2)
BILIRUB SERPL-MCNC: 0.4 MG/DL — SIGNIFICANT CHANGE UP (ref 0.2–1.2)
BUN SERPL-MCNC: 14 MG/DL — SIGNIFICANT CHANGE UP (ref 7–23)
CALCIUM SERPL-MCNC: 9.7 MG/DL — SIGNIFICANT CHANGE UP (ref 8.4–10.5)
CHLORIDE SERPL-SCNC: 103 MMOL/L — SIGNIFICANT CHANGE UP (ref 98–107)
CO2 SERPL-SCNC: 24 MMOL/L — SIGNIFICANT CHANGE UP (ref 22–31)
CREAT SERPL-MCNC: 0.78 MG/DL — SIGNIFICANT CHANGE UP (ref 0.5–1.3)
D DIMER BLD IA.RAPID-MCNC: <150 NG/ML DDU — SIGNIFICANT CHANGE UP
EGFR: 87 ML/MIN/1.73M2 — SIGNIFICANT CHANGE UP
EOSINOPHIL # BLD AUTO: 0.14 K/UL — SIGNIFICANT CHANGE UP (ref 0–0.5)
EOSINOPHIL NFR BLD AUTO: 1.9 % — SIGNIFICANT CHANGE UP (ref 0–6)
GLUCOSE SERPL-MCNC: 116 MG/DL — HIGH (ref 70–99)
HCT VFR BLD CALC: 42.8 % — SIGNIFICANT CHANGE UP (ref 34.5–45)
HGB BLD-MCNC: 13.1 G/DL — SIGNIFICANT CHANGE UP (ref 11.5–15.5)
IANC: 3.31 K/UL — SIGNIFICANT CHANGE UP (ref 1.8–7.4)
IMM GRANULOCYTES NFR BLD AUTO: 0.4 % — SIGNIFICANT CHANGE UP (ref 0–0.9)
LYMPHOCYTES # BLD AUTO: 3.15 K/UL — SIGNIFICANT CHANGE UP (ref 1–3.3)
LYMPHOCYTES # BLD AUTO: 42 % — SIGNIFICANT CHANGE UP (ref 13–44)
MCHC RBC-ENTMCNC: 27 PG — SIGNIFICANT CHANGE UP (ref 27–34)
MCHC RBC-ENTMCNC: 30.6 GM/DL — LOW (ref 32–36)
MCV RBC AUTO: 88.1 FL — SIGNIFICANT CHANGE UP (ref 80–100)
MONOCYTES # BLD AUTO: 0.84 K/UL — SIGNIFICANT CHANGE UP (ref 0–0.9)
MONOCYTES NFR BLD AUTO: 11.2 % — SIGNIFICANT CHANGE UP (ref 2–14)
NEUTROPHILS # BLD AUTO: 3.31 K/UL — SIGNIFICANT CHANGE UP (ref 1.8–7.4)
NEUTROPHILS NFR BLD AUTO: 44.1 % — SIGNIFICANT CHANGE UP (ref 43–77)
NRBC # BLD: 0 /100 WBCS — SIGNIFICANT CHANGE UP (ref 0–0)
NRBC # FLD: 0 K/UL — SIGNIFICANT CHANGE UP (ref 0–0)
PLATELET # BLD AUTO: 247 K/UL — SIGNIFICANT CHANGE UP (ref 150–400)
POTASSIUM SERPL-MCNC: 3.5 MMOL/L — SIGNIFICANT CHANGE UP (ref 3.5–5.3)
POTASSIUM SERPL-SCNC: 3.5 MMOL/L — SIGNIFICANT CHANGE UP (ref 3.5–5.3)
PROT SERPL-MCNC: 7.7 G/DL — SIGNIFICANT CHANGE UP (ref 6–8.3)
RBC # BLD: 4.86 M/UL — SIGNIFICANT CHANGE UP (ref 3.8–5.2)
RBC # FLD: 13.4 % — SIGNIFICANT CHANGE UP (ref 10.3–14.5)
SODIUM SERPL-SCNC: 141 MMOL/L — SIGNIFICANT CHANGE UP (ref 135–145)
TROPONIN T, HIGH SENSITIVITY RESULT: 10 NG/L — SIGNIFICANT CHANGE UP
WBC # BLD: 7.5 K/UL — SIGNIFICANT CHANGE UP (ref 3.8–10.5)
WBC # FLD AUTO: 7.5 K/UL — SIGNIFICANT CHANGE UP (ref 3.8–10.5)

## 2023-07-21 PROCEDURE — 76882 US LMTD JT/FCL EVL NVASC XTR: CPT | Mod: 26

## 2023-07-21 PROCEDURE — 73562 X-RAY EXAM OF KNEE 3: CPT | Mod: 26,LT

## 2023-07-21 PROCEDURE — 76937 US GUIDE VASCULAR ACCESS: CPT | Mod: 26

## 2023-07-21 PROCEDURE — 93971 EXTREMITY STUDY: CPT | Mod: 26,LT

## 2023-07-21 PROCEDURE — 71045 X-RAY EXAM CHEST 1 VIEW: CPT | Mod: 26

## 2023-07-21 RX ORDER — OXYCODONE HYDROCHLORIDE 5 MG/1
1 TABLET ORAL
Qty: 8 | Refills: 0
Start: 2023-07-21 | End: 2023-07-28

## 2023-07-21 RX ORDER — IBUPROFEN 200 MG
1 TABLET ORAL
Qty: 56 | Refills: 0
Start: 2023-07-21 | End: 2023-08-03

## 2023-07-21 RX ORDER — ACETAMINOPHEN 500 MG
2 TABLET ORAL
Qty: 112 | Refills: 0
Start: 2023-07-21 | End: 2023-08-03

## 2023-07-21 RX ORDER — KETOROLAC TROMETHAMINE 30 MG/ML
15 SYRINGE (ML) INJECTION ONCE
Refills: 0 | Status: DISCONTINUED | OUTPATIENT
Start: 2023-07-21 | End: 2023-07-21

## 2023-07-21 RX ORDER — DIPHENHYDRAMINE HCL 50 MG
50 CAPSULE ORAL ONCE
Refills: 0 | Status: COMPLETED | OUTPATIENT
Start: 2023-07-21 | End: 2023-07-21

## 2023-07-21 RX ORDER — OXYCODONE HYDROCHLORIDE 5 MG/1
1 TABLET ORAL
Qty: 9 | Refills: 0
Start: 2023-07-21 | End: 2023-07-29

## 2023-07-21 RX ADMIN — Medication 15 MILLIGRAM(S): at 07:10

## 2023-07-21 RX ADMIN — Medication 50 MILLIGRAM(S): at 04:23

## 2023-07-21 NOTE — ED ADULT NURSE REASSESSMENT NOTE - NS ED NURSE REASSESS COMMENT FT1
pt remains at baseline mental status, RR even unlabored completing full sentences. pt resting in stretcher comfortably at this time, no new complaints offered. stretcher lowest position siderails up safety measures in place. pt awaiting US results

## 2023-07-21 NOTE — ED PROVIDER NOTE - ATTENDING CONTRIBUTION TO CARE
57F h/o DM p/w L knee pain after steroid injection for OA by surgeon, injection was 2 days ago, woke up yest in severe pain rad to hip and foot.  Inability to bend the knee.  ALso c/o chest, pain rad to hand.  Having LBP as well.  Pt called surgeon who recc ibuprofen w/o improvement.  No F/C or rash or itchiness.  No N/V/D.  L knee no appreciable swelling.  Tender to palpation below patella.  Painful ROM valentino c/w other side.  Does not appear hot, or swollen.  EKG SR at 62 no mio no std no twi (+)LVH   qtc 426.  Will check bedside US eval for effusion as well.  Trace effusion only found.  Very low likelihood of septic arthritis.  Follow up with own ortho, rx ACE wrap and cane or crutches.  Unlikely ACS, seems to be a secondary complaint.    VS:  unremarkable    GEN - mild distress L knee pain, well appearing   A+O x3   HEAD - NC/AT     ENT - PEERL, EOMI, mucous membranes    moist , no discharge      NECK: Neck supple, non-tender without lymphadenopathy, no masses, no JVD  PULM - CTA b/l,  symmetric breath sounds  COR -  normal heart sounds    ABD - , ND, NT, soft,  BACK - no CVA tenderness, nontender spine     EXTREMS - no edema, no deformity, warm and well perfused  except L knee mild diffuse ttp, not warm or hot, no redness or skin change, able to range with some difficulty.  No palpable effusion, distal foot NVT intact.    SKIN - no rash    or bruising      NEUROLOGIC - alert, face symmetric, speech fluent, sensation nl, motor no focal deficit.

## 2023-07-21 NOTE — ED PROVIDER NOTE - CLINICAL SUMMARY MEDICAL DECISION MAKING FREE TEXT BOX
54y F with PMHx of Sciatica, HTN, Asthma, DMT2 (non insulin dependent) presents to ED c/o pain in the left leg after steroid injection in the left knee for her OA, PE showed limited ROM, tenderness to palpation, concerning for septic joint, pending labs, X ray, POCUS to evaluate effusion, will symptoms management with percocet.

## 2023-07-21 NOTE — ED PROVIDER NOTE - OBJECTIVE STATEMENT
54y F with PMHx of Sciatica, HTN, Asthma, DMT2 (non insulin dependent) presents to ED c/o pain in the left leg after steroid injection in the left knee for her OA, this was the 2nd time she received the shot, the first time was okay. she got it on 07/19, on the am of 07/20. rated 10/10, associated with inability to bend the knee, she also reported cp in the right side 8/10, left side 4/10, right hand 4/10. shooting sensation.  she called her doctor who recommended ibuprofen, she took them 3 times without any relief per her report. she denied fever or chills, n/v/d/c, 54y F with PMHx of Sciatica, HTN, Asthma, DMT2 (non insulin dependent) presents to ED c/o pain in the left leg after steroid injection in the left knee for her OA, this was the 2nd time she received the shot, the first time was okay. she got it on 07/19, on the am of 07/20. rated 10/10, associated with inability to bend the knee, she also reported cp in the right side 8/10, left side 4/10, right hand 4/10. shooting sensation.  she called her doctor who recommended ibuprofen, she took them 3 times without any relief per her report. she denied fever or chills, n/v/d/c, rashes, or itchiness.

## 2023-07-21 NOTE — ED PROVIDER NOTE - PHYSICAL EXAMINATION
PHYSICAL EXAM    Vital Signs Last 24 Hrs  T(C): 36.8 (20 Jul 2023 23:33), Max: 36.9 (20 Jul 2023 20:59)  T(F): 98.2 (20 Jul 2023 23:33), Max: 98.4 (20 Jul 2023 20:59)  HR: 59 (20 Jul 2023 23:33) (59 - 68)  BP: 130/78 (20 Jul 2023 23:33) (130/78 - 143/74)  BP(mean): --  RR: 18 (20 Jul 2023 23:33) (18 - 18)  SpO2: 100% (20 Jul 2023 23:33) (100% - 100%)    Parameters below as of 20 Jul 2023 23:33  Patient On (Oxygen Delivery Method): room air      GENERAL: NAD, lying comfortably in bed   HEAD:  Atraumatic, Normocephalic  EYES: EOMI b/l, PERRLA b/l, conjunctiva and sclera clear  NECK: Supple, No JVD, No LAD   CHEST/LUNG: Clear to auscultation bilaterally; No wheeze or rhonchi  HEART: Regular rate and rhythm; S1 and S2 present, No murmurs, rubs, or gallops  ABDOMEN: Soft, Nontender, Nondistended; Bowel sounds present  EXTREMITIES: left knee ttp, limited ROM   NEURO: AAOx3, non-focal   SKIN: No rashes or lesions

## 2023-07-21 NOTE — ED PROVIDER NOTE - NSFOLLOWUPINSTRUCTIONS_ED_ALL_ED_FT
you came in with sudden onset of leg pain, chest pain, and limited range of motion in he left joint, we performed imaging studies including the xray and ultrasound of your left knee and leg, which are all normal, we treated you with benadryl medication and oxycodone, your symptoms improved ,please take  Tylenol 650mg every 6 hours, ibuprofen 400mg every 6 hours for pain, and use crutches, ace wrap, ice, elevation for pain relief.  please follow up with your orthopedic for further management options.

## 2023-07-21 NOTE — ED PROVIDER NOTE - PATIENT PORTAL LINK FT
You can access the FollowMyHealth Patient Portal offered by [facility] by registering at the following website: [email or web address]. By joining [employer]’s FollowMyHealth portal, you will also be able to view your health information using other applications (apps) compatible with our system.

## 2023-09-21 ENCOUNTER — NON-APPOINTMENT (OUTPATIENT)
Age: 58
End: 2023-09-21

## 2023-12-12 ENCOUNTER — EMERGENCY (EMERGENCY)
Facility: HOSPITAL | Age: 58
LOS: 1 days | Discharge: HOME | End: 2023-12-12
Attending: STUDENT IN AN ORGANIZED HEALTH CARE EDUCATION/TRAINING PROGRAM | Admitting: STUDENT IN AN ORGANIZED HEALTH CARE EDUCATION/TRAINING PROGRAM
Payer: MEDICAID

## 2023-12-12 VITALS
TEMPERATURE: 98 F | DIASTOLIC BLOOD PRESSURE: 84 MMHG | RESPIRATION RATE: 16 BRPM | HEART RATE: 81 BPM | OXYGEN SATURATION: 99 % | SYSTOLIC BLOOD PRESSURE: 147 MMHG

## 2023-12-12 PROCEDURE — 99283 EMERGENCY DEPT VISIT LOW MDM: CPT

## 2023-12-12 NOTE — ED PROVIDER NOTE - NSFOLLOWUPCLINICS_GEN_ALL_ED_FT
[facility] - ENT  Otolaryngology (ENT)  [address] [place] [address]  Phone: [phone]  Fax:      [facility] - ENT  Otolaryngology (ENT)  [address] [place] [address]  Phone: [phone]  Fax:

## 2023-12-12 NOTE — ED ADULT TRIAGE NOTE - RESPIRATORY RATE (BREATHS/MIN)
16 Encounter addended by: Fang Daniels on: 7/5/2022 2:17 PM   Actions taken: Specialty comments modified

## 2023-12-12 NOTE — ED PROVIDER NOTE - PATIENT PORTAL LINK FT
You can access the FollowMyHealth Patient Portal offered by [facility] by registering at the following website: [email or web address]. By joining [employer]’s FollowMyHealth portal, you will also be able to view your health information using other applications (apps) compatible with our system. You can access the FollowMyHealth Patient Portal offered by [facility] by registering at the following website: [email or web address]. By joining [employer]’s FollowMyHealth portal, you will also be able to view your health information using other applications (apps) compatible with our system.

## 2023-12-12 NOTE — ED ADULT TRIAGE NOTE - PAIN: PRESENCE, MLM
12/03/2018    Obesity (BMI 35.0-39.9 without comorbidity) 12/03/2018    Excessive daytime sleepiness 12/03/2018    SOB (shortness of breath) on exertion 12/03/2018     Current Outpatient Medications   Medication Sig Dispense Refill    tiZANidine (ZANAFLEX) 4 MG tablet TAKE 1/2 TABLET BY MOUTH THREE TIMES A DAY FOR 2 DAYS, THEN TAKE 1 TABLET BY MOUTH THREE TIMES A DAY AFTER  2    losartan (COZAAR) 50 MG tablet Take 50 mg by mouth daily       dexlansoprazole (DEXILANT) 60 MG CPDR delayed release capsule Take 60 mg by mouth daily      zolpidem (AMBIEN) 10 MG tablet Take by mouth nightly as needed for Sleep      furosemide (LASIX) 40 MG tablet Take 1 tablet by mouth 2 times daily 180 tablet 3    dronedarone hcl (MULTAQ) 400 MG TABS Take 400 mg by mouth 2 times daily (with meals)      nitroGLYCERIN (NITROLINGUAL) 0.4 MG/SPRAY 0.4 mg spray Place 1 spray under the tongue every 5 minutes as needed for Chest pain      CPAP Machine MISC by Does not apply route nightly      apixaban (ELIQUIS) 5 MG TABS tablet Take 1 tablet by mouth 2 times daily 60 tablet 3    tadalafil (CIALIS) 5 MG tablet Take 1 tablet by mouth as needed for Erectile Dysfunction 30 tablet 0    rosuvastatin (CRESTOR) 20 MG tablet Take 20 mg by mouth daily.  pramipexole (MIRAPEX) 1 MG tablet   Take 0.5 mg by mouth 2 times daily Patient takes 1/2 tablet in the am and 1 1/2 tablet at night.  metoprolol (LOPRESSOR) 50 MG tablet Take 50 mg by mouth 2 times daily.  modafinil (PROVIGIL) 100 MG tablet Take 100 mg by mouth daily.  esomeprazole (NEXIUM) 40 MG capsule Take 40 mg by mouth 2 times daily.  aspirin 81 MG chewable tablet Take 81 mg by mouth daily. No current facility-administered medications for this visit. Allergies: Ceclor [cefaclor]; Celebrex [celecoxib];  Pcn [penicillins]; and Relafen [nabumetone]  Past Medical History:   Diagnosis Date    Aortic regurgitation 4/3/2012    4/3/2012-mild; denies pain/discomfort (Rating = 0) 2/7/2011-mild to moderate    Arrhythmia 2/22/2011 2/22/2011-tachycardia episode during cardiac cath-48 HR Holter monitor normal    Blisters of multiple sites 10/5/2016    CAD (coronary artery disease)     sees Dr Ruth Martinez, prior Dr Migue Kamara;   [employer] Cardiac pacemaker 4/2001 4/2001-[employer] PPM Integrity DDD Model 5342    Congestive heart failure [facility])     Family history of coronary artery disease     Father-MI    GERD (gastroesophageal reflux disease)     H/O cardiac catheterization 5/2/2012, 2/22/2011, 3/4/2010, 2/2001 5/2/2012-[facility]-No evid of signif CAD; Normal peripheral angiogram; 2/22/2011-No signif CAD, stent patent, had tachycardic event; 3/4/2010-Severe stenosis and re in-stent stenosis of LAD    H/O cardiovascular stress test 4/16/2012, 2/7/2011 4/16/2012-Normal Lexiscan study. No evid of ischemia. Attenuation artifact in inferior region of myocardium. EF 58%.  H/O cardiovascular stress test 3/4/2015    normal, no ischemia, EF62%    H/O cardiovascular stress test 12/06/2017    EF60% normal study    H/O Doppler ultrasound 2/7/2011 2/7/2011-CAROTID US-Normal    H/O Doppler ultrasound 5/24/2013    periphal normal    H/O Doppler ultrasound 6/18/14 6/14 Normal    H/O Doppler ultrasound 11/13/15 09/03/15    11/15 WNL 9/15Arterial and Venous Doppler is normal.     H/O echocardiogram 03/04/15    EF60% Normal LV and systolic function. Trace MR, Aortic, and TR. No other valvulopathy.      H/O echocardiogram 08/08/2016    EF60% mild AR, mild phtn    H/O echocardiogram 11/01/2017    EF55-60% sclerotic, non stenotic AV, mild AR, phtn    H/O peptic ulcer 1970s    Hiatal hernia     History of cardiac monitoring 6/1/15    Event - Patient noted to have recurrence of atrial fibrillation with RVR on medical therapy on event monitor    History of Doppler carotid echocardiogram 01/15/2020    No hemodynamically significant stenosis in the internal carotid artery bilaterally, Right internal carotid artery exhibits mild homogenous plaque, Left internal exhibits minimal homogenous plaque, totuosity of the bilateral internal carotid arteries    History of Holter monitoring 5/19/15    24 hour - predominant rhythm was sinus intermittent paced, paced rhythm can not be ruled out d/t pacemaker function being turned off during device setup, limited pacemaker data available    History of nuclear stress test 11/10/2016    lexiscan-normal,EF70%    HX OTHER MEDICAL 5/2/2012 5/2/2012-PERIPHERAL ANGIOGRAM-Normal-Dr Shey Mcduffie    HX OTHER MEDICAL 3/2/2011    3/2/2011-48 HR HOLTER MONITOR-Pred Rhythm is sinus rhythm. No arrhythmias.     Hyperlipidemia     Hypertension     S/P angioplasty with stent 3/4/2010, 2/2001    3/4/2010-PTCA with 2.75 X 38 mm stent to LAD; 2/2001-PTCA with stent to LAD    S/P cardiac pacemaker procedure 6/04/13    battery replacement    Sleep apnea     has CPAP     Past Surgical History:   Procedure Laterality Date    ATRIAL ABLATION SURGERY  8/11/15    Atrial fib/flutter ablation     CARDIAC CATHETERIZATION  06/14/2015    no significant disease    CARDIAC PACEMAKER PLACEMENT  4/2001 4/2001-[employer] PPM Integrity DDD Model 5342    CARPAL TUNNEL RELEASE Bilateral 1976    CORONARY ANGIOPLASTY WITH STENT PLACEMENT  3/4/2010, 2/2001    3/4/2010-PTCA with 2.75 X 38 mm stent to LAD; 2/2001-PTCA with stent to LAD;    DIAGNOSTIC CARDIAC CATH LAB PROCEDURE  5/2/2012,2/22/2011, 3/4/2010, 2/2001 5/2/2012-[facility]-No evid of signif CAD; Normal peripheral angiogram; 2/22/2011-No signif CAD, stent patent, had tachycardic event; 3/4/2010-Severe stenosis and re in-stent stenosis of LAD    FINGER TRIGGER RELEASE Left 1994    thumb    HAND TENDON SURGERY Left 1976    Left middle finger    HERNIA REPAIR Bilateral 1993    bilateral inguinal hernias    JOINT REPLACEMENT      knees bilaterally-Right 10/2009; 1/2008-Left    KNEE ARTHROSCOPY  11/2005, 1989 11/2005, 1989-Right knee    LUMBAR DISC ARTHROPLASTY  07/08/2016    OTHER SURGICAL HISTORY  5/2/2012 5/2/2012-PERIPHERAL ANGIOGRAM-Normal    UPPER GASTROINTESTINAL ENDOSCOPY  5/3/2001    5/3/6631-[id number] [place])      As reviewed   Family History   Problem Relation Age of Onset    Heart Disease Mother     High Blood Pressure Mother     Stroke Father     Coronary Art Dis Father         MI    Other Brother         accidental    Brain Cancer Paternal Grandfather      Social History     Tobacco Use    Smoking status: Never Smoker    Smokeless tobacco: Never Used   Substance Use Topics    Alcohol use: No     Alcohol/week: 0.0 standard drinks      Review of Systems:    Constitutional: Negative for diaphoresis and fatigue  Psychological:Negative for anxiety or depression  HENT: Negative for headaches, nasal congestion, sinus pain or vertigo  Eyes: Negative for visual disturbance. Endocrine: Negative for polydipsia/polyuria  Respiratory: Negative for shortness of breath  Cardiovascular: Negative for chest pain, dyspnea on exertion, claudication, edema, irregular heartbeat, murmur, palpitations or shortness of breath  Gastrointestinal: Negative for abdominal pain or heartburn  Genito-Urinary: Negative for urinary frequency/urgency  Musculoskeletal: Negative for muscle pain, muscular weakness, negative for pain in arm and leg or swelling in foot and leg  Neurological: Negative for dizziness, headaches, memory loss, numbness/tingling, visual changes, syncope  Dermatological: Negative for rash    Objective:  BP (!) 160/98   Pulse 72   Ht 5' 6\" (1.676 m)   Wt 215 lb 3.2 oz (97.6 kg)   BMI 34.73 kg/m²   Wt Readings from Last 3 Encounters:   11/09/20 215 lb 3.2 oz (97.6 kg)   03/04/20 213 lb (96.6 kg)   02/19/20 213 lb (96.6 kg)     Body mass index is 34.73 kg/m². GENERAL - Alert, oriented, pleasant, in no apparent distress. EYES: No jaundice, no conjunctival pallor. SKIN: It is warm & dry. No rashes.  No Echhymosis    HEENT - No clinically significant abnormalities seen. Neck - Supple. No jugular venous distention noted. No carotid bruits. Cardiovascular - Normal S1 and S2 without obvious murmur or gallop. Extremities - No cyanosis, clubbing, or significant edema. Pulmonary - No respiratory distress. No wheezes or rales. Abdomen - No masses, tenderness, or organomegaly. Musculoskeletal - No significant edema. No joint deformities. No muscle wasting. Neurologic - Cranial nerves II through XII are grossly intact. There were no gross focal neurologic abnormalities. Lab Review   Lab Results   Component Value Date    CKTOTAL 297 05/01/2012    CKMB 7.2 05/01/2012    TROPONINT 0.017 06/11/2015     BNP:    Lab Results   Component Value Date    BNP 43 12/22/2011     PT/INR:    Lab Results   Component Value Date    INR 0.92 01/04/2016     No results found for: LABA1C  Lab Results   Component Value Date    WBC 8.7 11/24/2019    HCT 33.6 (L) 11/24/2019    MCV 90.1 11/24/2019     11/24/2019     Lab Results   Component Value Date    CHOL 121 03/24/2017    TRIG 72 03/24/2017    HDL 48 03/24/2017    LDLDIRECT 67 03/24/2017     Lab Results   Component Value Date    ALT 20 11/24/2019    AST 22 11/24/2019     BMP:    Lab Results   Component Value Date     11/24/2019    K 3.5 11/24/2019    CL 96 11/24/2019    CO2 27 11/24/2019    BUN 15 11/24/2019    CREATININE 1.2 11/24/2019     CMP:   Lab Results   Component Value Date     11/24/2019    K 3.5 11/24/2019    CL 96 11/24/2019    CO2 27 11/24/2019    BUN 15 11/24/2019    PROT 6.6 11/24/2019    PROT 7.2 05/01/2012     TSH:    Lab Results   Component Value Date    TSHHS 0.925 03/24/2017       QUALITY MEASURES REVIEWED:    Impression:    No diagnosis found.    Patient Active Problem List   Diagnosis Code    History of PTCA Z98.61    Hyperlipidemia E78.5    Hypertension I10    Sleep apnea G47.30    Claudication ([facility] [address] I73.9    Pacer at end of battery life Z45.010    SSS

## 2023-12-12 NOTE — ED PROVIDER NOTE - PROGRESS NOTE DETAILS
MD Swanson: Pt was re-evaluated at bedside, VSS, feeling better overall, no further epistaxis. Discussed return precautions and follow up plan with PCP and/or ENT. Time was taken to answer any questions that the patient had before providing them with discharge paperwork.

## 2023-12-12 NOTE — ED ADULT TRIAGE NOTE - CHIEF COMPLAINT QUOTE
pt c/o epistaxis x 2 days. pt states she feels it coming down her throat. slight bleeding noted when wiping with a tissue noted at this time. no blood thinners.  hx- htn, DM2

## 2023-12-12 NOTE — ED PROVIDER NOTE - CLINICAL SUMMARY MEDICAL DECISION MAKING FREE TEXT BOX
58-year-old female, past medical history hypertension, asthma, and diabetes to, presents to ED complaining of intermittent left-sided epistaxis x 2 days.  Patient states yesterday he noted some blood dripping down throat.  Patient was recommended by urgent care and her PCP to present to ED for evaluation.  Patient not on any blood thinners.  Denies any nasal trauma.  At the time of this evaluation, epistaxis has resolved on its own with manual compression.  Patient denies fever/chills, chest pain, shortness of breath, nausea/vomiting, headache, lightheadedness/dizziness, weakness/numbness or any other symptoms at this time.    Vital signs stable.  Physical exam significant for well-appearing female in no acute distress.  Head is normocephalic/atraumatic.  Extraocular movements intact.  Pupils equal round and reactive to light.  Oropharynx is clear without any blood.  Neck is supple.  Bilateral nares are clear and without septal hematoma or active bleed.  Heart is regular rate and rhythm without murmur.  Lungs clear to auscultation bilaterally.  Abdomen is soft and nontender/nondistended.  No lower extremity edema.  Pulses are 2+ throughout.  Skin is warm and well-perfused.  Neuroexam nonfocal.  Otherwise unremarkable exam.    58-year-old female presenting for now self resolved epistaxis.  Given no blood thinners, and minimal bleeding, no indication for lab work at this time.  Will hobs for short period for rebleed.  If no rebleed will discharge with return precautions and recommended ENT follow-up.

## 2024-02-06 ENCOUNTER — APPOINTMENT (OUTPATIENT)
Dept: NEUROLOGY | Facility: CLINIC | Age: 59
End: 2024-02-06

## 2024-02-26 ENCOUNTER — APPOINTMENT (OUTPATIENT)
Dept: NEUROLOGY | Facility: CLINIC | Age: 59
End: 2024-02-26

## 2024-02-29 ENCOUNTER — NON-APPOINTMENT (OUTPATIENT)
Age: 59
End: 2024-02-29

## 2024-02-29 ENCOUNTER — APPOINTMENT (OUTPATIENT)
Dept: OPHTHALMOLOGY | Facility: CLINIC | Age: 59
End: 2024-02-29
Payer: MEDICAID

## 2024-02-29 PROCEDURE — 92004 COMPRE OPH EXAM NEW PT 1/>: CPT

## 2024-02-29 PROCEDURE — 92020 GONIOSCOPY: CPT

## 2024-02-29 PROCEDURE — 92015 DETERMINE REFRACTIVE STATE: CPT | Mod: NC

## 2024-02-29 PROCEDURE — 92132 CPTRZD OPH DX IMG ANT SGM: CPT

## 2024-03-28 ENCOUNTER — NON-APPOINTMENT (OUTPATIENT)
Age: 59
End: 2024-03-28

## 2024-04-04 ENCOUNTER — APPOINTMENT (OUTPATIENT)
Dept: ORTHOPEDIC SURGERY | Facility: CLINIC | Age: 59
End: 2024-04-04
Payer: MEDICAID

## 2024-04-04 VITALS — HEIGHT: 70.5 IN | WEIGHT: 202 LBS | BODY MASS INDEX: 28.6 KG/M2

## 2024-04-04 DIAGNOSIS — M50.30 OTHER CERVICAL DISC DEGENERATION, UNSPECIFIED CERVICAL REGION: ICD-10-CM

## 2024-04-04 DIAGNOSIS — M54.2 CERVICALGIA: ICD-10-CM

## 2024-04-04 PROCEDURE — 99204 OFFICE O/P NEW MOD 45 MIN: CPT

## 2024-04-04 PROCEDURE — 72040 X-RAY EXAM NECK SPINE 2-3 VW: CPT

## 2024-04-04 RX ORDER — CYCLOBENZAPRINE HYDROCHLORIDE 5 MG/1
5 TABLET, FILM COATED ORAL
Qty: 30 | Refills: 1 | Status: ACTIVE | COMMUNITY
Start: 2024-04-04 | End: 1900-01-01

## 2024-04-04 RX ORDER — MELOXICAM 15 MG/1
15 TABLET ORAL
Qty: 30 | Refills: 0 | Status: ACTIVE | COMMUNITY
Start: 2024-04-04 | End: 1900-01-01

## 2024-04-08 ENCOUNTER — APPOINTMENT (OUTPATIENT)
Dept: ORTHOPEDIC SURGERY | Facility: CLINIC | Age: 59
End: 2024-04-08
Payer: MEDICAID

## 2024-04-08 DIAGNOSIS — M65.4 RADIAL STYLOID TENOSYNOVITIS [DE QUERVAIN]: ICD-10-CM

## 2024-04-08 DIAGNOSIS — G56.01 CARPAL TUNNEL SYNDROME, RIGHT UPPER LIMB: ICD-10-CM

## 2024-04-08 PROCEDURE — 99203 OFFICE O/P NEW LOW 30 MIN: CPT

## 2024-04-08 PROCEDURE — 73130 X-RAY EXAM OF HAND: CPT | Mod: RT

## 2024-04-08 NOTE — HISTORY OF PRESENT ILLNESS
[de-identified] : 24: 59yo female (RHD) presents for RIGHT hand pain, numbness, tingling x 2 weeks. Pain is worse with use of hand - typing, writing, lifting. Numbness radiates from RIGHT shoulder to middle/ring fingers. Symptoms are intermittent. Wakes her from sleep. Denies injury. Denies injury. Went to [facility] on 3/29/24 => wrist brace. Wearing brace for sleep since then, reports no improvement.' Reports h/o CTS when she was ~22yo, for which she used to "rub it down." Saw Ortho Spine Dr. Chano Hernandez on 24 for cervical ddd => prescribed PT.  Hx: Asthma. MARTI. HTN. HLD. Sx: small bowel resection for obstruction. Hernia repair. . [] : no [FreeTextEntry1] : RIGHT hand  [FreeTextEntry5] : RYAN collins [RHD] 58 year old female is here today c/o RIGHT hand pain, numbness and tingling x 2 weeks w/o injury. denies prior injury to hand. reports h/o CTS. increased symptoms with typing/writing, right arm numbness in the AM. currently being treated for C-spine.

## 2024-04-08 NOTE — IMAGING
[de-identified] : RIGHT HAND skin intact. mild swelling of dorsal and radial wrist. TTP to dorsal and radial wrist. mild TTP to dorsal DRUJ. wrist ROM: good extension, flexion. + radial pain with wrist flexion/extension. good EPL, FPL. good finger extension, flex to full fist. good finger abduction and adduction.  SILT to median, ulnar, radial distributions. palpable radial pulse, brisk cap refill all digits. APB 5/5. no triggering. + Tinel's at carpal tunnel. + Finkelstein's test => mild pain.   XRAYS OF RIGHT HAND: no acute displaced fracture or dislocation. djd of DRUJ.

## 2024-04-08 NOTE — ASSESSMENT
[FreeTextEntry1] : The condition was explained to the patient.  Discussed risks and benefits of treatment options for tenosynovitis - activity modification, NSAID, splint, steroid injection, or surgery.  - EDX to evaluate CTS. - continue carpal tunnel night splint. - activity modification PRN.  F/u after EDX.

## 2024-04-29 ENCOUNTER — EMERGENCY (EMERGENCY)
Facility: HOSPITAL | Age: 59
LOS: 1 days | Discharge: HOME | End: 2024-04-29
Attending: EMERGENCY MEDICINE | Admitting: EMERGENCY MEDICINE
Payer: MEDICAID

## 2024-04-29 VITALS
DIASTOLIC BLOOD PRESSURE: 79 MMHG | OXYGEN SATURATION: 100 % | HEART RATE: 64 BPM | TEMPERATURE: 98 F | RESPIRATION RATE: 17 BRPM | SYSTOLIC BLOOD PRESSURE: 138 MMHG

## 2024-04-29 VITALS
SYSTOLIC BLOOD PRESSURE: 182 MMHG | OXYGEN SATURATION: 100 % | RESPIRATION RATE: 20 BRPM | DIASTOLIC BLOOD PRESSURE: 75 MMHG | TEMPERATURE: 98 F | HEART RATE: 88 BPM

## 2024-04-29 LAB
ALBUMIN SERPL ELPH-MCNC: 4.7 G/DL — SIGNIFICANT CHANGE UP (ref 3.3–5)
ALP SERPL-CCNC: 59 U/L — SIGNIFICANT CHANGE UP (ref 40–120)
ALT FLD-CCNC: 19 U/L — SIGNIFICANT CHANGE UP (ref 4–33)
ANION GAP SERPL CALC-SCNC: 13 MMOL/L — SIGNIFICANT CHANGE UP (ref 7–14)
APPEARANCE UR: CLEAR — SIGNIFICANT CHANGE UP
AST SERPL-CCNC: 17 U/L — SIGNIFICANT CHANGE UP (ref 4–32)
BACTERIA # UR AUTO: ABNORMAL /HPF
BASOPHILS # BLD AUTO: 0.04 K/UL — SIGNIFICANT CHANGE UP (ref 0–0.2)
BASOPHILS NFR BLD AUTO: 0.6 % — SIGNIFICANT CHANGE UP (ref 0–2)
BILIRUB SERPL-MCNC: 0.3 MG/DL — SIGNIFICANT CHANGE UP (ref 0.2–1.2)
BILIRUB UR-MCNC: NEGATIVE — SIGNIFICANT CHANGE UP
BUN SERPL-MCNC: 15 MG/DL — SIGNIFICANT CHANGE UP (ref 7–23)
CALCIUM SERPL-MCNC: 9.2 MG/DL — SIGNIFICANT CHANGE UP (ref 8.4–10.5)
CAST: 0 /LPF — SIGNIFICANT CHANGE UP (ref 0–4)
CHLORIDE SERPL-SCNC: 103 MMOL/L — SIGNIFICANT CHANGE UP (ref 98–107)
CO2 SERPL-SCNC: 24 MMOL/L — SIGNIFICANT CHANGE UP (ref 22–31)
COLOR SPEC: YELLOW — SIGNIFICANT CHANGE UP
CREAT SERPL-MCNC: 0.61 MG/DL — SIGNIFICANT CHANGE UP (ref 0.5–1.3)
DIFF PNL FLD: NEGATIVE — SIGNIFICANT CHANGE UP
EGFR: 102 ML/MIN/1.73M2 — SIGNIFICANT CHANGE UP
EOSINOPHIL # BLD AUTO: 0.1 K/UL — SIGNIFICANT CHANGE UP (ref 0–0.5)
EOSINOPHIL NFR BLD AUTO: 1.5 % — SIGNIFICANT CHANGE UP (ref 0–6)
GLUCOSE SERPL-MCNC: 219 MG/DL — HIGH (ref 70–99)
GLUCOSE UR QL: 100 MG/DL
HCT VFR BLD CALC: 41.9 % — SIGNIFICANT CHANGE UP (ref 34.5–45)
HGB BLD-MCNC: 13.4 G/DL — SIGNIFICANT CHANGE UP (ref 11.5–15.5)
IANC: 3.35 K/UL — SIGNIFICANT CHANGE UP (ref 1.8–7.4)
IMM GRANULOCYTES NFR BLD AUTO: 0.9 % — SIGNIFICANT CHANGE UP (ref 0–0.9)
KETONES UR-MCNC: NEGATIVE MG/DL — SIGNIFICANT CHANGE UP
LEUKOCYTE ESTERASE UR-ACNC: ABNORMAL
LYMPHOCYTES # BLD AUTO: 2.55 K/UL — SIGNIFICANT CHANGE UP (ref 1–3.3)
LYMPHOCYTES # BLD AUTO: 38.2 % — SIGNIFICANT CHANGE UP (ref 13–44)
MCHC RBC-ENTMCNC: 27.5 PG — SIGNIFICANT CHANGE UP (ref 27–34)
MCHC RBC-ENTMCNC: 32 GM/DL — SIGNIFICANT CHANGE UP (ref 32–36)
MCV RBC AUTO: 86 FL — SIGNIFICANT CHANGE UP (ref 80–100)
MONOCYTES # BLD AUTO: 0.58 K/UL — SIGNIFICANT CHANGE UP (ref 0–0.9)
MONOCYTES NFR BLD AUTO: 8.7 % — SIGNIFICANT CHANGE UP (ref 2–14)
NEUTROPHILS # BLD AUTO: 3.35 K/UL — SIGNIFICANT CHANGE UP (ref 1.8–7.4)
NEUTROPHILS NFR BLD AUTO: 50.1 % — SIGNIFICANT CHANGE UP (ref 43–77)
NITRITE UR-MCNC: NEGATIVE — SIGNIFICANT CHANGE UP
NRBC # BLD: 0 /100 WBCS — SIGNIFICANT CHANGE UP (ref 0–0)
NRBC # FLD: 0 K/UL — SIGNIFICANT CHANGE UP (ref 0–0)
PH UR: 5.5 — SIGNIFICANT CHANGE UP (ref 5–8)
PLATELET # BLD AUTO: 286 K/UL — SIGNIFICANT CHANGE UP (ref 150–400)
POTASSIUM SERPL-MCNC: 4.4 MMOL/L — SIGNIFICANT CHANGE UP (ref 3.5–5.3)
POTASSIUM SERPL-SCNC: 4.4 MMOL/L — SIGNIFICANT CHANGE UP (ref 3.5–5.3)
PROT SERPL-MCNC: 7.6 G/DL — SIGNIFICANT CHANGE UP (ref 6–8.3)
PROT UR-MCNC: NEGATIVE MG/DL — SIGNIFICANT CHANGE UP
RBC # BLD: 4.87 M/UL — SIGNIFICANT CHANGE UP (ref 3.8–5.2)
RBC # FLD: 13.7 % — SIGNIFICANT CHANGE UP (ref 10.3–14.5)
RBC CASTS # UR COMP ASSIST: 1 /HPF — SIGNIFICANT CHANGE UP (ref 0–4)
SODIUM SERPL-SCNC: 140 MMOL/L — SIGNIFICANT CHANGE UP (ref 135–145)
SP GR SPEC: 1.02 — SIGNIFICANT CHANGE UP (ref 1–1.03)
SQUAMOUS # UR AUTO: 7 /HPF — HIGH (ref 0–5)
UROBILINOGEN FLD QL: 1 MG/DL — SIGNIFICANT CHANGE UP (ref 0.2–1)
WBC # BLD: 6.68 K/UL — SIGNIFICANT CHANGE UP (ref 3.8–10.5)
WBC # FLD AUTO: 6.68 K/UL — SIGNIFICANT CHANGE UP (ref 3.8–10.5)
WBC UR QL: 7 /HPF — HIGH (ref 0–5)

## 2024-04-29 PROCEDURE — 72131 CT LUMBAR SPINE W/O DYE: CPT | Mod: 26,MC

## 2024-04-29 PROCEDURE — 99285 EMERGENCY DEPT VISIT HI MDM: CPT

## 2024-04-29 RX ORDER — KETOROLAC TROMETHAMINE 30 MG/ML
15 SYRINGE (ML) INJECTION ONCE
Refills: 0 | Status: DISCONTINUED | OUTPATIENT
Start: 2024-04-29 | End: 2024-04-29

## 2024-04-29 RX ORDER — HYDROMORPHONE HYDROCHLORIDE 2 MG/ML
1 INJECTION INTRAMUSCULAR; INTRAVENOUS; SUBCUTANEOUS ONCE
Refills: 0 | Status: DISCONTINUED | OUTPATIENT
Start: 2024-04-29 | End: 2024-04-29

## 2024-04-29 RX ORDER — OXYCODONE HYDROCHLORIDE 5 MG/1
1 TABLET ORAL
Qty: 12 | Refills: 0
Start: 2024-04-29 | End: 2024-05-01

## 2024-04-29 RX ORDER — OXYCODONE AND ACETAMINOPHEN 5; 325 MG/1; MG/1
1 TABLET ORAL
Qty: 12 | Refills: 0
Start: 2024-04-29 | End: 2024-05-01

## 2024-04-29 RX ORDER — LIDOCAINE 4 G/100G
1 CREAM TOPICAL ONCE
Refills: 0 | Status: COMPLETED | OUTPATIENT
Start: 2024-04-29 | End: 2024-04-29

## 2024-04-29 RX ORDER — ACETAMINOPHEN 500 MG
975 TABLET ORAL ONCE
Refills: 0 | Status: DISCONTINUED | OUTPATIENT
Start: 2024-04-29 | End: 2024-04-29

## 2024-04-29 RX ORDER — ACETAMINOPHEN 500 MG
650 TABLET ORAL ONCE
Refills: 0 | Status: COMPLETED | OUTPATIENT
Start: 2024-04-29 | End: 2024-04-29

## 2024-04-29 RX ORDER — CYCLOBENZAPRINE HYDROCHLORIDE 10 MG/1
5 TABLET, FILM COATED ORAL ONCE
Refills: 0 | Status: COMPLETED | OUTPATIENT
Start: 2024-04-29 | End: 2024-04-29

## 2024-04-29 RX ORDER — MORPHINE SULFATE 50 MG/1
4 CAPSULE, EXTENDED RELEASE ORAL ONCE
Refills: 0 | Status: DISCONTINUED | OUTPATIENT
Start: 2024-04-29 | End: 2024-04-29

## 2024-04-29 RX ORDER — DIAZEPAM 5 MG
5 TABLET ORAL ONCE
Refills: 0 | Status: DISCONTINUED | OUTPATIENT
Start: 2024-04-29 | End: 2024-04-29

## 2024-04-29 RX ADMIN — Medication 15 MILLIGRAM(S): at 09:04

## 2024-04-29 RX ADMIN — LIDOCAINE 1 PATCH: 4 CREAM TOPICAL at 08:35

## 2024-04-29 RX ADMIN — HYDROMORPHONE HYDROCHLORIDE 1 MILLIGRAM(S): 2 INJECTION INTRAMUSCULAR; INTRAVENOUS; SUBCUTANEOUS at 11:00

## 2024-04-29 RX ADMIN — Medication 15 MILLIGRAM(S): at 08:35

## 2024-04-29 RX ADMIN — Medication 650 MILLIGRAM(S): at 08:34

## 2024-04-29 RX ADMIN — HYDROMORPHONE HYDROCHLORIDE 1 MILLIGRAM(S): 2 INJECTION INTRAMUSCULAR; INTRAVENOUS; SUBCUTANEOUS at 12:00

## 2024-04-29 RX ADMIN — MORPHINE SULFATE 4 MILLIGRAM(S): 50 CAPSULE, EXTENDED RELEASE ORAL at 09:18

## 2024-04-29 RX ADMIN — Medication 650 MILLIGRAM(S): at 09:04

## 2024-04-29 RX ADMIN — CYCLOBENZAPRINE HYDROCHLORIDE 5 MILLIGRAM(S): 10 TABLET, FILM COATED ORAL at 08:33

## 2024-04-29 RX ADMIN — Medication 5 MILLIGRAM(S): at 09:19

## 2024-04-29 NOTE — ED ADULT NURSE NOTE - OBJECTIVE STATEMENT
Patient received to intake room 9 A&Ox4, ambulatory at baseline, difficulty ambulating due to pain at this time, Hx sciatica, HTN, DM2 coming to the ED for c/o right sided lower back pain that radiates into the right leg that started while in the shower this morning acutely. Denies fall. Patient endorsing numbness and tingling to the right lower extremity. Patient denies chest pain, sob, fevers, chills at this time. RR equal and unlabored. Medicated as ordered. Care plan continued. Comfort measures provided. Safety maintained.

## 2024-04-29 NOTE — ED PROVIDER NOTE - CLINICAL SUMMARY MEDICAL DECISION MAKING FREE TEXT BOX
58F presents to the ED with back pain with no red flags consistent with lumbosacral strain, sciatica, or herniated nucleus pulposis. Will treat symptomatically and re-eval

## 2024-04-29 NOTE — ED PROVIDER NOTE - PROGRESS NOTE DETAILS
pt feels better after iv medications. CT withou acute pathology. Ambulatory without difficulty. Numbness resolved. Will dc with spine center follow-up

## 2024-04-29 NOTE — ED ADULT NURSE NOTE - NSFALLRISKINTERV_ED_ALL_ED

## 2024-04-29 NOTE — ED PROVIDER NOTE - NSFOLLOWUPINSTRUCTIONS_ED_ALL_ED_FT
You were seen in the ER for back pain.    Your examination and CT did not show signs of a life-threatening condition causing your back pain.    Take Tylenol 975mg every 8 hours for the next 3 days for pain along with a lidocaine patch (can be purchased over the counter - called Salonpas). Although we are sending you home, no ER evaluation is complete without outpatient follow-up. Follow up with your regular doctor/our spine center within the next 1 week. Return to the ER for worsening symptoms, numbness/tingling, difficulty, peeing or pooping, weakness, or fever You were seen in the ER for back pain.    Your examination and CT did not show signs of a life-threatening condition causing your back pain.    Take motrin 600mg every 8 hours for the next 3 days for pain along with a lidocaine patch (can be purchased over the counter - called Salonpas). Take oxycodone for breakthrough pain. Although we are sending you home, no ER evaluation is complete without outpatient follow-up. Follow up with your regular doctor/our spine center within the next 1 week. Return to the ER for worsening symptoms, numbness/tingling, difficulty, peeing or pooping, weakness, or fever You were seen in the ER for back pain.    Your examination and CT did not show signs of a life-threatening condition causing your back pain.    Take motrin 600mg every 8 hours for the next 3 days for pain along with a lidocaine patch (can be purchased over the counter - called Salonpas). Take PERCOCET for breakthrough pain - this medication contains TYLENOL. Although we are sending you home, no ER evaluation is complete without outpatient follow-up. Follow up with your regular doctor/our spine center within the next 1 week. Return to the ER for worsening symptoms, numbness/tingling, difficulty, peeing or pooping, weakness, or fever

## 2024-04-29 NOTE — ED PROVIDER NOTE - PHYSICAL EXAMINATION
CONSTITUTIONAL: Well appearing, awake, alert  ENMT: Airway patent, tolerating secretions.    NECK: Supple. No JVD  EYES: Clear bilaterally, pupils equal, round  CARDIAC: Warm, well-perfused  RESPIRATORY: Equal Chest Rise, no stridor. No respiratory distress.   GASTROINTESTINAL: Non-distended  MUSCULOSKELETAL: No gross joint deformity   R Lower lumbar paraspinal TTP. No C/T/L Spine TTP in midline   R 4+/5 Hip Flexion/Extension (appears pain related  L 5/5 Hip Flexion/Extension  R 5/5 Knee Flexion/Extension  L 5/5 Knee Flexion/Extension   R 5/5 Ankle Flexion/Extension  L 5/5 Ankle Flexion Extension   R 5/5 Dorsi and Plantar Flexion   L 5/5 Dorsi and Plantar Flexion   SILT and Symmetric in thigh, knee, ankle, dorsal, plantar foot  NEUROLOGICAL: Alert. Speech Fluent. Attends to conversation and exam  SKIN:  No erythema    PSYCHIATRIC: Normal affect. Cooperative with history and exam

## 2024-04-29 NOTE — ED PROCEDURE NOTE - CPROC ED INFORMED CONSENT1
[Disease: _____________________] : Disease: [unfilled] Benefits, risks, and possible complications of procedure explained to patient/caregiver who verbalized understanding and gave verbal consent. [T: ___] : T[unfilled] [N: ___] : N[unfilled] [M: ___] : M[unfilled] [AJCC Stage: ____] : AJCC Stage: [unfilled] [de-identified] : Ms. Franklin was a new set of health still December of 2013 when she noticed some vague abdominal pain at which time she presented to the emergency room was found to have a mesenteric mass measuring 4.2 cm in greatest dimension by the aortic bifurcation. She eventually underwent a laparoscopic biopsy by Dr. Dennis Singh is found to be consistent with a neuroendocrine tumor. Patient was eventually seen by a gastroenterologist Dr. Adi Patel who performed an upper endoscopy and colonoscopy that was unremarkable and was socially followed by a capsule study that revealed a mass in the jejunum patient was subsequently taken to the OR by Dr. 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  since there is no evidence of disease at that time and no evidence of metastatic disease after discussion with the patient and her presentation at tumor patient was placed on surveillance therapy off treatment.\par \par In November 2017 found to have metastatic disease and started on lanreotide [de-identified] : chromogranin and serotonin [FreeTextEntry1] : Lanreotide  [de-identified] : July 2019 CT c/a/p showed liver metastases, unchanged to slightly increased in size compared with the prior study. 4 mm left lower lobe groundglass pulmonary nodule, unchanged dating back to the 2014 exam, most consistent with a benign etiology. \par \par In the meantime she was evaluated by Dr. Lilly at [place] for liver transplantation. The patient has been noncompliant with her lanreotide treatment. The last dose was in July, 2019, missed June dose. Amairani comes in for follow up while on Lanreotide and feels well overall.  She denies any abdominal pain, diarrhea. She continues to work without issues. Patient still has not had consultation with IR to discuss liver directed therapy (she requested). \par \par \par

## 2024-04-29 NOTE — ED ADULT TRIAGE NOTE - CHIEF COMPLAINT QUOTE
lower back pain that started while in the shower this morning radiating to RLE- unable to bare weight 2/2 pain- hx sciatica HTN DNT2

## 2024-04-29 NOTE — ED PROVIDER NOTE - NS ED ROS FT
CONSTITUTIONAL: no fever or chills. No weakness    Eyes: No visual change  · ENMT: no runny nose or sore throat  · CARDIOVASCULAR: no chest pain   · RESPIRATORY:  no cough or  shortness of breath.  · GASTROINTESTINAL: No abdominal pain,no nausea,  no diarrhea, no vomiting  · GENITOURINARY: no dysuria or increased urinary frequency  · MUSCULOSKELETAL: + back pain    NEURO: No weakness, no numbness/tingling   · ROS STATEMENT: all other ROS negative except as per HPI

## 2024-04-29 NOTE — ED PROVIDER NOTE - OBJECTIVE STATEMENT
58F past medical hx of DM/HTN/asthma/sciatica presents to the ED with back pain. Patient notes she was in the shower and was bending over to clean legs and  had sudden onset R lumbar pain with radiation down leg. Had difficulty walking so came to the ED. Has not taken any medication since this. No direct trauma or fall. Notes numbness in R Foot. No other numbness. No saddle anesthesia, fever, weakness  Notes she has not had sciatica in a long time and forgets what exactly it felt like - no hx of spinal surgeries

## 2024-04-29 NOTE — ED PROVIDER NOTE - PATIENT PORTAL LINK FT
You can access the FollowMyHealth Patient Portal offered by [facility] by registering at the following website: [email or web address]. By joining [employer]’s FollowMyHealth portal, you will also be able to view your health information using other applications (apps) compatible with our system.

## 2024-04-29 NOTE — ED PROVIDER NOTE - ATTENDING CONTRIBUTION TO CARE
Dr. Chacon:  I have personally performed a face to face bedside history and physical examination of this patient. I have discussed the history, examination, review of systems, assessment and plan of management with the resident. I have reviewed the electronic medical record and amended it to reflect my history, review of systems, physical exam, assessment and plan.    58F h/o HTN, DM, asthma, sciatica, presents with severe lower back pain radiating down RLE.  Pt was in the shower this morning, was bending over to clean her legs and had acute onset pain.  Difficulty moving afterwards.  Denies direct trauma, no saddle anesthesia, weakness, retention/incontinence.  C/o mild paresthesia to R foot, sensation equal in legs.  ROS otherwise negative.    Exam:  - holding herself stiffly in bed  - rrr  - ctab   -abd soft ntnd  - TTP all across lumbar area, +mobile soft palpable nodule on lumbar area (pt states is chronic)  - difficulty moving secondary to pain in back, but dorsiflexion & plantar flexion 5/5 strength bilaterally    A/P  - lumbar pain, likely MSK; no neuro deficit on exam at this point that would be concerning for spinal cord involvement  - pain control, CT lumbar spine to r/o occult fracture given severe pain and limited mobility of patient

## 2024-04-30 LAB
CULTURE RESULTS: ABNORMAL
SPECIMEN SOURCE: SIGNIFICANT CHANGE UP

## 2024-11-04 NOTE — ED PROVIDER NOTE - WORK/EXCUSE FORM DATE
Dr Lombardi is prescribing her the weight loss medication according to the medication list.   13-Dec-2023

## 2025-01-15 ENCOUNTER — NON-APPOINTMENT (OUTPATIENT)
Age: 60
End: 2025-01-15

## 2025-05-22 ENCOUNTER — NON-APPOINTMENT (OUTPATIENT)
Age: 60
End: 2025-05-22

## 2025-05-22 ENCOUNTER — APPOINTMENT (OUTPATIENT)
Dept: OPHTHALMOLOGY | Facility: CLINIC | Age: 60
End: 2025-05-22
Payer: MEDICAID

## 2025-05-22 PROCEDURE — 92014 COMPRE OPH EXAM EST PT 1/>: CPT

## 2025-05-22 PROCEDURE — 92132 CPTRZD OPH DX IMG ANT SGM: CPT

## 2025-05-22 PROCEDURE — 92020 GONIOSCOPY: CPT

## 2025-09-06 ENCOUNTER — NON-APPOINTMENT (OUTPATIENT)
Age: 60
End: 2025-09-06